# Patient Record
Sex: MALE | Race: WHITE | NOT HISPANIC OR LATINO | Employment: FULL TIME | URBAN - METROPOLITAN AREA
[De-identification: names, ages, dates, MRNs, and addresses within clinical notes are randomized per-mention and may not be internally consistent; named-entity substitution may affect disease eponyms.]

---

## 2019-11-03 ENCOUNTER — APPOINTMENT (EMERGENCY)
Dept: RADIOLOGY | Facility: HOSPITAL | Age: 49
DRG: 871 | End: 2019-11-03

## 2019-11-03 ENCOUNTER — APPOINTMENT (INPATIENT)
Dept: RADIOLOGY | Facility: HOSPITAL | Age: 49
DRG: 871 | End: 2019-11-03

## 2019-11-03 ENCOUNTER — HOSPITAL ENCOUNTER (INPATIENT)
Facility: HOSPITAL | Age: 49
LOS: 2 days | Discharge: HOME | DRG: 871 | End: 2019-11-05
Attending: EMERGENCY MEDICINE | Admitting: INTERNAL MEDICINE

## 2019-11-03 DIAGNOSIS — R77.8 ELEVATED TROPONIN I LEVEL: ICD-10-CM

## 2019-11-03 DIAGNOSIS — J69.0 ASPIRATION PNEUMONIA (HCC): ICD-10-CM

## 2019-11-03 DIAGNOSIS — Z72.0 TOBACCO ABUSE: Chronic | ICD-10-CM

## 2019-11-03 DIAGNOSIS — T40.1X1A HEROIN OVERDOSE (HCC): ICD-10-CM

## 2019-11-03 DIAGNOSIS — A41.9 SEPTIC SHOCK (HCC): Primary | ICD-10-CM

## 2019-11-03 DIAGNOSIS — R65.21 SEPTIC SHOCK (HCC): Primary | ICD-10-CM

## 2019-11-03 PROBLEM — R73.9 HYPERGLYCEMIA: Status: ACTIVE | Noted: 2019-11-03

## 2019-11-03 PROBLEM — J96.02 ACUTE RESPIRATORY FAILURE WITH HYPOXIA AND HYPERCAPNIA (HCC): Status: ACTIVE | Noted: 2019-11-03

## 2019-11-03 PROBLEM — E87.2 LACTIC ACIDOSIS: Status: ACTIVE | Noted: 2019-11-03

## 2019-11-03 PROBLEM — R74.8 ELEVATED CK: Status: ACTIVE | Noted: 2019-11-03

## 2019-11-03 PROBLEM — M25.531 RIGHT WRIST PAIN: Status: ACTIVE | Noted: 2019-11-03

## 2019-11-03 PROBLEM — G93.40 ENCEPHALOPATHY: Status: ACTIVE | Noted: 2019-11-03

## 2019-11-03 PROBLEM — R79.89 ELEVATED TROPONIN: Status: ACTIVE | Noted: 2019-11-03

## 2019-11-03 PROBLEM — N17.9 AKI (ACUTE KIDNEY INJURY) (HCC): Status: ACTIVE | Noted: 2019-11-03

## 2019-11-03 PROBLEM — G93.40 ENCEPHALOPATHY: Status: RESOLVED | Noted: 2019-11-03 | Resolved: 2019-11-03

## 2019-11-03 PROBLEM — E87.20 LACTIC ACIDOSIS: Status: ACTIVE | Noted: 2019-11-03

## 2019-11-03 PROBLEM — R65.20 SEVERE SEPSIS (HCC): Status: ACTIVE | Noted: 2019-11-03

## 2019-11-03 PROBLEM — T50.901A ACCIDENTAL DRUG OVERDOSE: Status: ACTIVE | Noted: 2019-11-03

## 2019-11-03 PROBLEM — J96.01 ACUTE RESPIRATORY FAILURE WITH HYPOXIA (HCC): Status: ACTIVE | Noted: 2019-11-03

## 2019-11-03 LAB
ACANTHOCYTES BLD QL SMEAR: PRESENT
ALBUMIN SERPL BCP-MCNC: 3.8 G/DL (ref 3.5–5)
ALP SERPL-CCNC: 72 U/L (ref 46–116)
ALT SERPL W P-5'-P-CCNC: 32 U/L (ref 12–78)
AMPHETAMINES SERPL QL SCN: NEGATIVE
ANION GAP SERPL CALCULATED.3IONS-SCNC: 14 MMOL/L (ref 4–13)
APAP SERPL-MCNC: <2 UG/ML (ref 10–20)
APTT PPP: 27 SECONDS (ref 23–37)
AST SERPL W P-5'-P-CCNC: 29 U/L (ref 5–45)
BARBITURATES UR QL: NEGATIVE
BASE EXCESS BLDA CALC-SCNC: -6 MMOL/L (ref -2–3)
BASOPHILS # BLD MANUAL: 0 THOUSAND/UL (ref 0–0.1)
BASOPHILS NFR MAR MANUAL: 0 % (ref 0–1)
BENZODIAZ UR QL: NEGATIVE
BILIRUB SERPL-MCNC: 0.5 MG/DL (ref 0.2–1)
BILIRUB UR QL STRIP: NEGATIVE
BUN SERPL-MCNC: 26 MG/DL (ref 5–25)
CALCIUM SERPL-MCNC: 8.5 MG/DL (ref 8.3–10.1)
CHLORIDE SERPL-SCNC: 101 MMOL/L (ref 100–108)
CK MB SERPL-MCNC: 1.3 % (ref 0–2.5)
CK MB SERPL-MCNC: 4.6 NG/ML (ref 0–5)
CK SERPL-CCNC: 344 U/L (ref 39–308)
CLARITY UR: CLEAR
CO2 SERPL-SCNC: 23 MMOL/L (ref 21–32)
COCAINE UR QL: NEGATIVE
COLOR UR: YELLOW
CREAT SERPL-MCNC: 1.52 MG/DL (ref 0.6–1.3)
EOSINOPHIL # BLD MANUAL: 0 THOUSAND/UL (ref 0–0.4)
EOSINOPHIL NFR BLD MANUAL: 0 % (ref 0–6)
ERYTHROCYTE [DISTWIDTH] IN BLOOD BY AUTOMATED COUNT: 14.1 % (ref 11.6–15.1)
EST. AVERAGE GLUCOSE BLD GHB EST-MCNC: 134 MG/DL
ETHANOL SERPL-MCNC: <3 MG/DL (ref 0–3)
FIO2 GAS DIL.REBREATH: 85 L
GFR SERPL CREATININE-BSD FRML MDRD: 54 ML/MIN/1.73SQ M
GLUCOSE SERPL-MCNC: 220 MG/DL (ref 65–140)
GLUCOSE SERPL-MCNC: 61 MG/DL (ref 65–140)
GLUCOSE SERPL-MCNC: 91 MG/DL (ref 65–140)
GLUCOSE UR STRIP-MCNC: ABNORMAL MG/DL
HBA1C MFR BLD: 6.3 % (ref 4.2–6.3)
HCO3 BLDA-SCNC: 20.9 MMOL/L (ref 22–28)
HCT VFR BLD AUTO: 42.5 % (ref 36.5–49.3)
HGB BLD-MCNC: 13.3 G/DL (ref 12–17)
HGB UR QL STRIP.AUTO: NEGATIVE
HYPERCHROMIA BLD QL SMEAR: PRESENT
INR PPP: 1.01 (ref 0.84–1.19)
KETONES UR STRIP-MCNC: NEGATIVE MG/DL
LACTATE SERPL-SCNC: 2 MMOL/L (ref 0.5–2)
LACTATE SERPL-SCNC: 5.2 MMOL/L (ref 0.5–2)
LEUKOCYTE ESTERASE UR QL STRIP: NEGATIVE
LYMPHOCYTES # BLD AUTO: 1.2 THOUSAND/UL (ref 0.6–4.47)
LYMPHOCYTES # BLD AUTO: 6 % (ref 14–44)
MCH RBC QN AUTO: 30.4 PG (ref 26.8–34.3)
MCHC RBC AUTO-ENTMCNC: 31.3 G/DL (ref 31.4–37.4)
MCV RBC AUTO: 97 FL (ref 82–98)
METHADONE UR QL: NEGATIVE
MONOCYTES # BLD AUTO: 0.2 THOUSAND/UL (ref 0–1.22)
MONOCYTES NFR BLD: 1 % (ref 4–12)
NEUTROPHILS # BLD MANUAL: 18.64 THOUSAND/UL (ref 1.85–7.62)
NEUTS BAND NFR BLD MANUAL: 3 % (ref 0–8)
NEUTS SEG NFR BLD AUTO: 90 % (ref 43–75)
NITRITE UR QL STRIP: NEGATIVE
NRBC BLD AUTO-RTO: 0 /100 WBCS
OPIATES UR QL SCN: NEGATIVE
PCO2 BLD: 22 MMOL/L (ref 21–32)
PCO2 BLD: 48.2 MM HG (ref 36–44)
PCP UR QL: NEGATIVE
PH BLD: 7.25 [PH] (ref 7.35–7.45)
PH UR STRIP.AUTO: 5.5 [PH]
PLATELET # BLD AUTO: 211 THOUSANDS/UL (ref 149–390)
PLATELET BLD QL SMEAR: ADEQUATE
PMV BLD AUTO: 11 FL (ref 8.9–12.7)
PO2 BLD: 64 MM HG (ref 75–129)
POIKILOCYTOSIS BLD QL SMEAR: PRESENT
POLYCHROMASIA BLD QL SMEAR: PRESENT
POTASSIUM SERPL-SCNC: 4.8 MMOL/L (ref 3.5–5.3)
PROCALCITONIN SERPL-MCNC: 0.6 NG/ML
PROT SERPL-MCNC: 7.3 G/DL (ref 6.4–8.2)
PROT UR STRIP-MCNC: NEGATIVE MG/DL
PROTHROMBIN TIME: 13 SECONDS (ref 11.6–14.5)
RBC # BLD AUTO: 4.37 MILLION/UL (ref 3.88–5.62)
RBC MORPH BLD: PRESENT
SALICYLATES SERPL-MCNC: 3.1 MG/DL (ref 3–20)
SAO2 % BLD FROM PO2: 88 % (ref 60–85)
SODIUM SERPL-SCNC: 138 MMOL/L (ref 136–145)
SP GR UR STRIP.AUTO: 1.02 (ref 1–1.03)
SPECIMEN SOURCE: ABNORMAL
THC UR QL: NEGATIVE
TOTAL CELLS COUNTED SPEC: 100
TROPONIN I SERPL-MCNC: 0.09 NG/ML
TROPONIN I SERPL-MCNC: 0.31 NG/ML
TROPONIN I SERPL-MCNC: 0.32 NG/ML
TSH SERPL DL<=0.05 MIU/L-ACNC: 0.5 UIU/ML (ref 0.36–3.74)
UROBILINOGEN UR QL STRIP.AUTO: 0.2 E.U./DL
WBC # BLD AUTO: 20.04 THOUSAND/UL (ref 4.31–10.16)

## 2019-11-03 PROCEDURE — 82948 REAGENT STRIP/BLOOD GLUCOSE: CPT

## 2019-11-03 PROCEDURE — 87040 BLOOD CULTURE FOR BACTERIA: CPT | Performed by: EMERGENCY MEDICINE

## 2019-11-03 PROCEDURE — 80329 ANALGESICS NON-OPIOID 1 OR 2: CPT | Performed by: EMERGENCY MEDICINE

## 2019-11-03 PROCEDURE — 99223 1ST HOSP IP/OBS HIGH 75: CPT | Performed by: INTERNAL MEDICINE

## 2019-11-03 PROCEDURE — 84443 ASSAY THYROID STIM HORMONE: CPT | Performed by: NURSE PRACTITIONER

## 2019-11-03 PROCEDURE — 84145 PROCALCITONIN (PCT): CPT | Performed by: INTERNAL MEDICINE

## 2019-11-03 PROCEDURE — 99285 EMERGENCY DEPT VISIT HI MDM: CPT

## 2019-11-03 PROCEDURE — 82553 CREATINE MB FRACTION: CPT | Performed by: EMERGENCY MEDICINE

## 2019-11-03 PROCEDURE — 36415 COLL VENOUS BLD VENIPUNCTURE: CPT | Performed by: EMERGENCY MEDICINE

## 2019-11-03 PROCEDURE — 82803 BLOOD GASES ANY COMBINATION: CPT

## 2019-11-03 PROCEDURE — 73100 X-RAY EXAM OF WRIST: CPT

## 2019-11-03 PROCEDURE — 80307 DRUG TEST PRSMV CHEM ANLYZR: CPT | Performed by: NURSE PRACTITIONER

## 2019-11-03 PROCEDURE — 81003 URINALYSIS AUTO W/O SCOPE: CPT | Performed by: NURSE PRACTITIONER

## 2019-11-03 PROCEDURE — 80053 COMPREHEN METABOLIC PANEL: CPT | Performed by: EMERGENCY MEDICINE

## 2019-11-03 PROCEDURE — 80320 DRUG SCREEN QUANTALCOHOLS: CPT | Performed by: EMERGENCY MEDICINE

## 2019-11-03 PROCEDURE — 83036 HEMOGLOBIN GLYCOSYLATED A1C: CPT | Performed by: NURSE PRACTITIONER

## 2019-11-03 PROCEDURE — 84484 ASSAY OF TROPONIN QUANT: CPT | Performed by: EMERGENCY MEDICINE

## 2019-11-03 PROCEDURE — 85610 PROTHROMBIN TIME: CPT | Performed by: EMERGENCY MEDICINE

## 2019-11-03 PROCEDURE — 71045 X-RAY EXAM CHEST 1 VIEW: CPT

## 2019-11-03 PROCEDURE — 94760 N-INVAS EAR/PLS OXIMETRY 1: CPT

## 2019-11-03 PROCEDURE — 83605 ASSAY OF LACTIC ACID: CPT | Performed by: EMERGENCY MEDICINE

## 2019-11-03 PROCEDURE — 82550 ASSAY OF CK (CPK): CPT | Performed by: EMERGENCY MEDICINE

## 2019-11-03 PROCEDURE — 85027 COMPLETE CBC AUTOMATED: CPT | Performed by: EMERGENCY MEDICINE

## 2019-11-03 PROCEDURE — 96361 HYDRATE IV INFUSION ADD-ON: CPT

## 2019-11-03 PROCEDURE — 96360 HYDRATION IV INFUSION INIT: CPT

## 2019-11-03 PROCEDURE — 36600 WITHDRAWAL OF ARTERIAL BLOOD: CPT

## 2019-11-03 PROCEDURE — 85007 BL SMEAR W/DIFF WBC COUNT: CPT | Performed by: EMERGENCY MEDICINE

## 2019-11-03 PROCEDURE — 85730 THROMBOPLASTIN TIME PARTIAL: CPT | Performed by: EMERGENCY MEDICINE

## 2019-11-03 PROCEDURE — 84484 ASSAY OF TROPONIN QUANT: CPT | Performed by: NURSE PRACTITIONER

## 2019-11-03 PROCEDURE — 93005 ELECTROCARDIOGRAM TRACING: CPT

## 2019-11-03 PROCEDURE — 99285 EMERGENCY DEPT VISIT HI MDM: CPT | Performed by: EMERGENCY MEDICINE

## 2019-11-03 RX ORDER — ACETAMINOPHEN 325 MG/1
650 TABLET ORAL EVERY 6 HOURS PRN
Status: DISCONTINUED | OUTPATIENT
Start: 2019-11-03 | End: 2019-11-05 | Stop reason: HOSPADM

## 2019-11-03 RX ORDER — NALOXONE HYDROCHLORIDE 1 MG/ML
1 INJECTION PARENTERAL ONCE
Status: COMPLETED | OUTPATIENT
Start: 2019-11-03 | End: 2019-11-03

## 2019-11-03 RX ORDER — ONDANSETRON 2 MG/ML
2 INJECTION INTRAMUSCULAR; INTRAVENOUS ONCE
Status: COMPLETED | OUTPATIENT
Start: 2019-11-03 | End: 2019-11-03

## 2019-11-03 RX ORDER — HEPARIN SODIUM 5000 [USP'U]/ML
5000 INJECTION, SOLUTION INTRAVENOUS; SUBCUTANEOUS EVERY 8 HOURS SCHEDULED
Status: DISCONTINUED | OUTPATIENT
Start: 2019-11-03 | End: 2019-11-05

## 2019-11-03 RX ORDER — NICOTINE 21 MG/24HR
1 PATCH, TRANSDERMAL 24 HOURS TRANSDERMAL DAILY
Status: DISCONTINUED | OUTPATIENT
Start: 2019-11-03 | End: 2019-11-05 | Stop reason: HOSPADM

## 2019-11-03 RX ORDER — CEFTRIAXONE 1 G/50ML
1000 INJECTION, SOLUTION INTRAVENOUS EVERY 24 HOURS
Status: DISCONTINUED | OUTPATIENT
Start: 2019-11-04 | End: 2019-11-05

## 2019-11-03 RX ORDER — LORAZEPAM 2 MG/ML
1 INJECTION INTRAMUSCULAR ONCE
Status: COMPLETED | OUTPATIENT
Start: 2019-11-03 | End: 2019-11-03

## 2019-11-03 RX ADMIN — HEPARIN SODIUM 5000 UNITS: 5000 INJECTION INTRAVENOUS; SUBCUTANEOUS at 18:17

## 2019-11-03 RX ADMIN — SODIUM CHLORIDE 1000 ML: 0.9 INJECTION, SOLUTION INTRAVENOUS at 14:08

## 2019-11-03 RX ADMIN — SODIUM CHLORIDE 1000 ML: 0.9 INJECTION, SOLUTION INTRAVENOUS at 15:50

## 2019-11-03 RX ADMIN — CEFEPIME HYDROCHLORIDE 2000 MG: 2 INJECTION, SOLUTION INTRAVENOUS at 15:44

## 2019-11-03 RX ADMIN — METRONIDAZOLE 500 MG: 500 INJECTION, SOLUTION INTRAVENOUS at 15:51

## 2019-11-03 RX ADMIN — NICOTINE 1 PATCH: 21 PATCH, EXTENDED RELEASE TRANSDERMAL at 18:17

## 2019-11-03 RX ADMIN — SODIUM CHLORIDE 448 ML: 0.9 INJECTION, SOLUTION INTRAVENOUS at 16:22

## 2019-11-03 RX ADMIN — HEPARIN SODIUM 5000 UNITS: 5000 INJECTION INTRAVENOUS; SUBCUTANEOUS at 21:58

## 2019-11-03 NOTE — H&P
H&P- Fatemeh Jimenez 1970, 54 y o  male MRN: [id number]    Unit/Bed#: -02 Encounter: [id number]    Primary Care Provider: No primary care provider on file  Date and time admitted to hospital: 11/3/2019  2:03 PM        * Acute respiratory failure with hypoxia and hypercapnia (HCC)  Assessment & Plan  · Secondary to aspiration pneumonia versus pneumonitis  · Chest x-ray with left-sided haziness  · Continue ceftriaxone/Flagyl  · Transition from non-rebreather to high-flow nasal cannula for saturations greater than 92%  · Incentive spirometry/pulmonary hygiene  · Repeat chest x-ray in the a m  Severe sepsis ([facility] [address] )  Assessment & Plan  · Secondary to aspiration pneumonia/pneumonitis  · Chest x-ray with left-sided haziness  · Patient was found in his car with vomit currently coughing up food particles  · Blood cultures pending  · Check procalcitonin/UA  · Ceftriaxone/Flagyl    Accidental drug overdose  Assessment & Plan  · Patient admits to snorting 1 bag of heroin  · Denies any suicidal ideation  · Cessation education  · Monitor for withdrawal symptoms  · Patient states he uses only occasionally last use was greater than 1 month ago  · Coma panel pending    Lactic acidosis  Assessment & Plan  · 5 2  · 30 cc/kilos IV fluids given trend    Aspiration pneumonitis (HCC)  Assessment & Plan  · As above    LETITIA (acute kidney injury) ([facility] [address] )  Assessment & Plan  · Likely pre renal  · IV fluid 30 cc/kilogram bolus if not taking oral start IV fluid infusion  · BMP in the a m  · Check UA  ·     Right wrist pain  Assessment & Plan  · Strength slightly weaker at 4/5  Sensation decreased in distal hand  · Concern for potential nerve injury  · Pulses palpable  · Will check x-ray    Elevated CK  Assessment & Plan  · At 344 Mild  · Volume resuscitation a m   CK    Hyperglycemia  Assessment & Plan  · Check A1c  · Sliding-scale insulin    Elevated troponin  Assessment & Plan  · Troponin 0 09 trend  · EKG without acute changes    Tobacco abuse  Assessment & Plan  · Nicotine patch    Encephalopathyresolved as of 11/3/2019  Assessment & Plan  · Secondary to heroin overdose resolved post Narcan    VTE Prophylaxis: Heparin  / sequential compression device   Code Status:  Full code  POLST: POLST form is not discussed and not completed at this time  Discussion with family:  Patient    Anticipated Length of Stay:  Patient will be admitted on an Inpatient basis with an anticipated length of stay of  greater than 2 midnights  Justification for Hospital Stay:  Pneumonia/hypoxia    Total Time for Visit, including Counseling / Coordination of Care: 45 minutes  Greater than 50% of this total time spent on direct patient counseling and coordination of care  Chief Complaint:   Unresponsiveness    History of Present Illness:    Aylin Liu is a 54 y o  male with no significant past medical history other than heroin abuse who presents with found unresponsive in his car  Patient states he remembers getting up this morning to run errands  He went into a bathroom and snorted a bag of heroin  He remembers getting back in his car but does not remember after that  Per ER reports he was found in his driveway by his father covered in vomit unresponsive with the windows down  EMS was called  Narcan 0 4 mg was given with minimal response  With additional 0 4 mg patient became agitated and was given Ativan  On arrival to the ER he was noted to be hypothermic at 95 3 tachycardic and hypertensive in the 170s oxygen saturations were in the 70s to 80s on room air was placed on non-rebreather mask for sats of 93%  Labs revealed a WBC count of 20/creatinine of 1 52/lactic acid of 5 2/CK of 344  ABG revealed 7 25/48/64/20/6/88%  Chest x-ray with left-sided haziness and patient was given cefepime/Flagyl    Review of Systems:    Review of Systems   Musculoskeletal: Positive for myalgias     All other systems reviewed and are negative  Past Medical and Surgical History:     Past Medical History:   Diagnosis Date    Heroin use        History reviewed  No pertinent surgical history  Meds/Allergies:    Prior to Admission medications    Not on File     I have reviewed home medications with patient personally  Allergies: No Known Allergies    Social History:     Marital Status: Unknown   Occupation:    Patient Pre-hospital Living Situation:  Home  Patient Pre-hospital Level of Mobility:  Active  Patient Pre-hospital Diet Restrictions:  None  Substance Use History:   Social History     Substance and Sexual Activity   Alcohol Use Yes    Frequency: Monthly or less     Social History     Tobacco Use   Smoking Status Current Every Day Smoker    Packs/day: 1 00    Years: 30 00    Pack years: 30 00   Smokeless Tobacco Never Used     Social History     Substance and Sexual Activity   Drug Use Yes    Types: Marijuana, Heroin       Family History:    History reviewed  No pertinent family history  Physical Exam:     Vitals:   Blood Pressure: 121/81 (11/03/19 1630)  Pulse: (!) 113 (11/03/19 1630)  Temperature: 98 3 °F (36 8 °C) (11/03/19 1600)  Temp Source: Oral (11/03/19 1600)  Respirations: 20 (11/03/19 1630)  Weight - Scale: 81 6 kg (180 lb) (11/03/19 1442)  SpO2: 95 % (11/03/19 1630)    Physical Exam   Constitutional: He is oriented to person, place, and time  No distress  HENT:   Head: Normocephalic and atraumatic  Mouth/Throat: Oropharynx is clear and moist    Eyes: Pupils are equal, round, and reactive to light  Conjunctivae are normal  No scleral icterus  Neck: Neck supple  No tracheal deviation present  Cardiovascular: Regular rhythm, normal heart sounds and intact distal pulses  Tachycardia present  Exam reveals no gallop and no friction rub  No murmur heard  Pulmonary/Chest: Effort normal  No respiratory distress   He has decreased breath sounds in the left upper field, the left middle field and the left lower field  He has no wheezes  He has no rales  Decreased breath sounds on the left and bronchial   Abdominal: Soft  Bowel sounds are normal  He exhibits no distension  There is no tenderness  Musculoskeletal: He exhibits no edema, tenderness or deformity  Left hand strength 4/5 patient with difficulty flexing and extending rest denies any numbness or tingling  Sensation slightly decreased from rest down  Neurological: He is alert and oriented to person, place, and time  No cranial nerve deficit  Skin: Skin is warm and dry  No rash noted  No erythema  No pallor  Psychiatric: He has a normal mood and affect  His behavior is normal            Additional Data:     Lab Results: I have personally reviewed pertinent reports  and I have personally reviewed pertinent films in PACS    Results from last 7 days   Lab Units 11/03/19  1407   WBC Thousand/uL 20 04*   HEMOGLOBIN g/dL 13 3   HEMATOCRIT % 42 5   PLATELETS Thousands/uL 211   BANDS PCT % 3   LYMPHO PCT % 6*   MONO PCT % 1*   EOS PCT % 0     Results from last 7 days   Lab Units 11/03/19  1607 11/03/19  1407   SODIUM mmol/L  --  138   POTASSIUM mmol/L  --  4 8   CHLORIDE mmol/L  --  101   CO2 mmol/L  --  23   CO2, I-STAT mmol/L 22  --    BUN mg/dL  --  26*   CREATININE mg/dL  --  1 52*   ANION GAP mmol/L  --  14*   CALCIUM mg/dL  --  8 5   ALBUMIN g/dL  --  3 8   TOTAL BILIRUBIN mg/dL  --  0 50   ALK PHOS U/L  --  72   ALT U/L  --  32   AST U/L  --  29   GLUCOSE RANDOM mg/dL  --  220*     Results from last 7 days   Lab Units 11/03/19  1431   INR  1 01             Results from last 7 days   Lab Units 11/03/19  1620 11/03/19  1431   LACTIC ACID mmol/L 2 0 5 2*       Imaging: I have personally reviewed pertinent reports     and I have personally reviewed pertinent films in PACS    XR chest 1 view portable   ED Interpretation by Luigi Trejo DO (11/03 4366)   Abnormal   Increased markings/patchy infiltrate left side          EKG, Pathology, and Other Studies Reviewed on Admission:   · EKG:  Sinus tachycardia no acute ST changes QRS 84     Allscripts / Epic Records Reviewed: Yes     ** Please Note: This note has been constructed using a voice recognition system   **

## 2019-11-03 NOTE — ASSESSMENT & PLAN NOTE
· Strength slightly weaker at 4/5  Sensation decreased in distal hand    · Concern for potential nerve injury  · Pulses palpable  · Will check x-ray

## 2019-11-03 NOTE — ASSESSMENT & PLAN NOTE
· Secondary to aspiration pneumonia/pneumonitis  · Chest x-ray with left-sided haziness  · Patient was found in his car with vomit currently coughing up food particles  · Blood cultures pending  · Check procalcitonin/UA  · Ceftriaxone/Flagyl

## 2019-11-03 NOTE — ASSESSMENT & PLAN NOTE
· Patient admits to snorting 1 bag of heroin  · Denies any suicidal ideation  · Cessation education  · Monitor for withdrawal symptoms  · Patient states he uses only occasionally last use was greater than 1 month ago  · Coma panel pending

## 2019-11-03 NOTE — SEPSIS NOTE
Sepsis Note   Shirley Galloway 54 y o  male MRN: [id number]  Unit/Bed#: ED 07 Encounter: [id number]      qSOFA     Row Name 11/03/19 1430 11/03/19 1359             Altered mental status GCS < 15           Respiratory Rate > / =22  0  0       Systolic BP < / =185  0  0       Q Sofa Score  0  0           Initial Sepsis Screening     Row Name 11/03/19 1538                Is the patient's history suggestive of a new or worsening infection? (!) Yes (Proceed)  -VL        Suspected source of infection  pneumonia  -VL        Are two or more of the following signs & symptoms of infection both present and new to the patient? (!) Yes (Proceed)  -VL        Indicate SIRS criteria  Altered mental status; Tachypnea > 20 resp per min;Leukocytosis (WBC > 57583 IJL)  -VL        If the answer is yes to both questions, suspicion of sepsis is present          If severe sepsis is present AND tissue hypoperfusion perists in the hour after fluid resuscitation or lactate > 4, the patient meets criteria for SEPTIC SHOCK          Are any of the following organ dysfunction criteria present within 6 hours of suspected infection and SIRS criteria that are NOT considered to be chronic conditions? (!) Yes  -VL        Organ dysfunction  Lactate >/equal 4 0 mmol/L  -VL        Date of presentation of severe sepsis          Time of presentation of severe sepsis          Tissue hypoperfusion persists in the hour after crystalloid fluid administration, evidenced, by either:  * OR * Lactate level is greater to or equal 4 mmol/dL ( ___ mmol/dL in comment field)  -VL        Was hypotension present within one hour of the conclusion of crystalloid fluid administration?           Date of presentation of septic shock  11/03/19  -VL        Time of presentation of septic shock  1538  -VL          User Key  (r) = Recorded By, (t) = Taken By, (c) = Cosigned By    [address] Name Provider Type    VL DO Loren Rodriguez

## 2019-11-03 NOTE — ED PROVIDER NOTES
History  Chief Complaint   Patient presents with    Overdose - Accidental     Pt found by his father slumped over in his car with the windows down, possibly there overnight  Pt has a known history of drug abuse  Hx of heroin abuse  Pt remembers going out yesterday am and then nothing else  Per EMS, father said pt went to work yesterday and dad went to bed before pt came home  This am, dad went outside and found pt in his vehicle with the windows down, unresponsive, covered in vomit  Upon EMS arrival, pt unresponsive, pinpoint pupils, agonal resps  Given 0 4mg Narcan w/ minimal improvement  Given additional 0 4mg narcan and woke up, combative, tremulous, retching  Given ativan en route for agitation  Upon arrival to the ED, pt awake and alert and states he only remembers going out yesterday  Admits to recreational heroin use, snorts  Denies SI/HI  Denies other substances at this time      History provided by:  Patient and EMS personnel  History limited by:  Mental status change   used: No    Overdose - Accidental   Ingested substance:  Illicit drugs  Suspected agents: heroin  Time since incident: unknown  Ingestion amount:  Unknown  Witnesses present: no    Called poison control: no    Incident location:  Unable to specify  Context: intentional ingestion, recreational and substance found on patient's clothing    Context: not suicide attempt    Associated symptoms: unresponsiveness        None       Past Medical History:   Diagnosis Date    Heroin use        History reviewed  No pertinent surgical history  Family History   Problem Relation Age of Onset    Diabetes Father      I have reviewed and agree with the history as documented      Social History     Tobacco Use    Smoking status: Current Every Day Smoker     Packs/day: 1 00     Years: 30 00     Pack years: 30 00    Smokeless tobacco: Never Used   Substance Use Topics    Alcohol use: Yes     Frequency: Monthly or less Comment: "maybe twice a year"    Drug use: Yes     Types: Marijuana, Heroin        Review of Systems   Unable to perform ROS: Mental status change       Physical Exam  Physical Exam   Constitutional: He appears well-developed and well-nourished  HENT:   Nose: Nose normal    vomitus noted surrounding mouth   Eyes: Pupils are equal, round, and reactive to light  Conjunctivae and EOM are normal    Neck: Neck supple  Cardiovascular: Normal rate and regular rhythm  Pulmonary/Chest: No accessory muscle usage  Tachypnea noted  No respiratory distress  He has decreased breath sounds  Abdominal: Soft  There is no tenderness  Musculoskeletal: He exhibits no deformity  Neurological: He is alert  Skin: Skin is warm  Psychiatric: He has a normal mood and affect  Nursing note and vitals reviewed        Vital Signs  ED Triage Vitals   Temperature Pulse Respirations Blood Pressure SpO2   11/03/19 1359 11/03/19 1359 11/03/19 1359 11/03/19 1359 11/03/19 1359   (!) 95 3 °F (35 2 °C) (!) 113 12 (!) 178/85 93 %      Temp Source Heart Rate Source Patient Position - Orthostatic VS BP Location FiO2 (%)   11/03/19 1359 11/03/19 1359 11/03/19 1359 11/03/19 1359 11/03/19 1705   Rectal Monitor Lying Right arm 80      Pain Score       11/03/19 1359       No Pain           Vitals:    11/03/19 1600 11/03/19 1630 11/03/19 1645 11/03/19 1655   BP: 118/72 121/81 129/74 124/80   Pulse: 104 (!) 113 95 100   Patient Position - Orthostatic VS: Lying Lying  Lying         Visual Acuity      ED Medications  Medications   sodium chloride 0 9 % bolus 1,000 mL (0 mL Intravenous Stopped 11/3/19 1622)     Followed by   sodium chloride 0 9 % bolus 1,000 mL (1,000 mL Intravenous Not Given 11/3/19 1624)     Followed by   sodium chloride 0 9 % bolus 448 mL (448 mL Intravenous New Bag 11/3/19 1622)   nicotine (NICODERM CQ) 21 mg/24 hr TD 24 hr patch 1 patch (1 patch Transdermal Medication Applied 11/3/19 1817)   heparin (porcine) subcutaneous injection 5,000 Units (5,000 Units Subcutaneous Given 11/3/19 1817)   acetaminophen (TYLENOL) tablet 650 mg (has no administration in time range)   insulin lispro (HumaLOG) 100 units/mL subcutaneous injection 1-6 Units (1 Units Subcutaneous Not Given 11/3/19 1715)   insulin lispro (HumaLOG) 100 units/mL subcutaneous injection 1-5 Units (has no administration in time range)   cefTRIAXone (ROCEPHIN) IVPB (premix) 1,000 mg (has no administration in time range)   metroNIDAZOLE (FLAGYL) IVPB (premix) 500 mg (has no administration in time range)   sodium chloride 0 9 % bolus 1,000 mL (0 mL Intravenous Stopped 11/3/19 1622)   naloxone (FOR EMS ONLY) (NARCAN) 2 MG/2ML injection 2 mg (0 mg Does not apply Given to EMS 11/3/19 1408)   ondansetron (FOR EMS ONLY) (ZOFRAN) 4 mg/2 mL injection 8 mg (0 mg Does not apply Given to EMS 11/3/19 1408)   LORazepam (FOR EMS ONLY) (ATIVAN) 2 mg/mL injection 2 mg (0 mg Does not apply Given to EMS 11/3/19 1408)   cefepime (MAXIPIME) 2 g/50 mL dextrose IVPB (0 mg Intravenous Stopped 11/3/19 1551)   metroNIDAZOLE (FLAGYL) IVPB (premix) 500 mg (0 mg Intravenous Stopped 11/3/19 1622)       Diagnostic Studies  Results Reviewed     Procedure Component Value Units Date/Time    Rapid drug screen, urine [[id number]] Collected:  11/03/19 1809    Lab Status:  No result Specimen:  Urine, Clean Catch     TSH, 3rd generation with Free T4 reflex [[id number]]  (Normal) Collected:  11/03/19 1407    Lab Status:  Final result Specimen:  Blood from Arm, Left Updated:  11/03/19 1729     TSH 3RD GENERATON 0 498 uIU/mL     Narrative:       Patients undergoing fluorescein dye angiography may retain small amounts of fluorescein in the body for 48-72 hours post procedure  Samples containing fluorescein can produce falsely depressed TSH values  If the patient had this procedure,a specimen should be resubmitted post fluorescein clearance        Hemoglobin A1C w/ EAG Estimation [[id number]] Collected:  11/03/19 1407    Lab Status: In process Specimen:  Blood from Arm, Left Updated:  11/03/19 1714    Lactic acid x2 Q2H [[id number]]  (Normal) Collected:  11/03/19 1620    Lab Status:  Final result Specimen:  Blood from Arm, Right Updated:  11/03/19 1654     LACTIC ACID 2 0 mmol/L     Narrative:       Result may be elevated if tourniquet was used during collection  CBC and differential [[id number]]  (Abnormal) Collected:  11/03/19 1407    Lab Status:  Final result Specimen:  Blood from Arm, Left Updated:  11/03/19 1619     WBC 20 04 Thousand/uL      RBC 4 37 Million/uL      Hemoglobin 13 3 g/dL      Hematocrit 42 5 %      MCV 97 fL      MCH 30 4 pg      MCHC 31 3 g/dL      RDW 14 1 %      MPV 11 0 fL      Platelets 602 Thousands/uL      nRBC 0 /100 WBCs     Narrative: This is an appended report  These results have been appended to a previously verified report  Lactic acid x2 Q2H [[id number]]     Lab Status:  No result Specimen:  Blood     POCT Blood Gas (G3+) [[id number]]  (Abnormal) Collected:  11/03/19 1607    Lab Status:  Final result Specimen:  Arterial Updated:  11/03/19 1613     pH, Art i-STAT 7 245     pCO2, Art i-STAT 48 2 mm HG      pO2, ART i-STAT 64 0 mm HG      BE, i-STAT -6 mmol/L      HCO3, Art i-STAT 20 9 mmol/L      CO2, i-STAT 22 mmol/L      O2 Sat, i-STAT 88 %      POC FIO2 85 L      Specimen Type ARTERIAL    Lactic acid, plasma [[id number]]  (Abnormal) Collected:  11/03/19 1431    Lab Status:  Final result Specimen:  Blood from Arm, Left Updated:  11/03/19 1534     LACTIC ACID 5 2 mmol/L     Narrative:       Result may be elevated if tourniquet was used during collection  Acetaminophen level-If concentration is detectable, please discuss with medical  on call   [[id number]]  (Abnormal) Collected:  11/03/19 1407    Lab Status:  Final result Specimen:  Blood from Arm, Left Updated:  11/03/19 1532     Acetaminophen Level <2 0 ug/mL     CKMB [[id number]]  (Normal) Collected:  11/03/19 1407    Lab Status:  Final result Specimen:  Blood from Arm, Left Updated:  11/03/19 1526     CK-MB Index 1 3 %      CK-MB 4 6 ng/mL     Salicylate level [[id number]]  (Normal) Collected:  11/03/19 1407    Lab Status:  Final result Specimen:  Blood from Arm, Left Updated:  59/35/50 1103     Salicylate Lvl 3 1 mg/dL     CK (with reflex to MB) [[id number]]  (Abnormal) Collected:  11/03/19 1407    Lab Status:  Final result Specimen:  Blood from Arm, Left Updated:  11/03/19 1524     Total  U/L     Ethanol [[id number]]  (Normal) Collected:  11/03/19 1407    Lab Status:  Final result Specimen:  Blood from Arm, Left Updated:  11/03/19 1519     Ethanol Lvl <3 mg/dL     Troponin I [[id number]]  (Abnormal) Collected:  11/03/19 1407    Lab Status:  Final result Specimen:  Blood from Arm, Left Updated:  11/03/19 1506     Troponin I 0 09 ng/mL     Comprehensive metabolic panel [[id number]]  (Abnormal) Collected:  11/03/19 1407    Lab Status:  Final result Specimen:  Blood from Arm, Left Updated:  11/03/19 1505     Sodium 138 mmol/L      Potassium 4 8 mmol/L      Chloride 101 mmol/L      CO2 23 mmol/L      ANION GAP 14 mmol/L      BUN 26 mg/dL      Creatinine 1 52 mg/dL      Glucose 220 mg/dL      Calcium 8 5 mg/dL      AST 29 U/L      ALT 32 U/L      Alkaline Phosphatase 72 U/L      Total Protein 7 3 g/dL      Albumin 3 8 g/dL      Total Bilirubin 0 50 mg/dL      eGFR 54 ml/min/1 73sq m     Narrative:       [facility] guidelines for Chronic Kidney Disease (CKD):     Stage 1 with normal or high GFR (GFR > 90 mL/min/1 73 square meters)    Stage 2 Mild CKD (GFR = 60-89 mL/min/1 73 square meters)    Stage 3A Moderate CKD (GFR = 45-59 mL/min/1 73 square meters)    Stage 3B Moderate CKD (GFR = 30-44 mL/min/1 73 square meters)    Stage 4 Severe CKD (GFR = 15-29 mL/min/1 73 square meters)    Stage 5 End Stage CKD (GFR <15 mL/min/1 73 square meters)  Note: GFR calculation is accurate only with a steady state creatinine Protime-INR [[id number]]  (Normal) Collected:  11/03/19 1431    Lab Status:  Final result Specimen:  Blood from Arm, Left Updated:  11/03/19 1454     Protime 13 0 seconds      INR 1 01    APTT [[id number]]  (Normal) Collected:  11/03/19 1431    Lab Status:  Final result Specimen:  Blood from Arm, Left Updated:  11/03/19 1454     PTT 27 seconds     Blood culture #2 [[id number]] Collected:  11/03/19 1431    Lab Status: In process Specimen:  Blood from Arm, Right Updated:  11/03/19 1437    Blood culture #1 [[id number]] Collected:  11/03/19 1431    Lab Status: In process Specimen:  Blood from Arm, Left Updated:  11/03/19 1437    POCT urinalysis dipstick [[id number]]     Lab Status:  No result Specimen:  Urine                  XR chest 1 view portable   ED Interpretation by Grecia Villalobos DO (11/03 1456)   Abnormal   Increased markings/patchy infiltrate left side      XR wrist 2 vw right    (Results Pending)   XR chest portable    (Results Pending)              Procedures  ECG 12 Lead Documentation Only  Date/Time: 11/3/2019 2:10 PM  Performed by: Grecia Villalobos DO  Authorized by: Grecia Villalobos DO     ECG reviewed by me, the ED Provider: yes    Patient location:  ED  Previous ECG:     Previous ECG:  Unavailable  Interpretation:     Interpretation: non-specific    Rate:     ECG rate:  112    ECG rate assessment: tachycardic    Rhythm:     Rhythm: sinus tachycardia    Ectopy:     Ectopy: none    QRS:     QRS axis:  Right  ST segments:     ST segments:  Normal  T waves:     T waves: normal             ED Course      1615 pt started coughing and ended up bringing up solid particulate food material (two whole large elbow macaroni and some indistinct material) increasing the concern for possible aspiration    Pt will be transitioned from NRB to Vapotherm and admitted for further evaluation              Initial Sepsis Screening     Row Name 11/03/19 2462                Is the patient's history suggestive of a new or worsening infection? (!) Yes (Proceed)  -VL        Suspected source of infection  pneumonia  -VL        Are two or more of the following signs & symptoms of infection both present and new to the patient? (!) Yes (Proceed)  -VL        Indicate SIRS criteria  Altered mental status; Tachypnea > 20 resp per min;Leukocytosis (WBC > 98106 IJL)  -VL        If the answer is yes to both questions, suspicion of sepsis is present          If severe sepsis is present AND tissue hypoperfusion perists in the hour after fluid resuscitation or lactate > 4, the patient meets criteria for SEPTIC SHOCK          Are any of the following organ dysfunction criteria present within 6 hours of suspected infection and SIRS criteria that are NOT considered to be chronic conditions? (!) Yes  -VL        Organ dysfunction  Lactate >/equal 4 0 mmol/L  -VL        Date of presentation of severe sepsis          Time of presentation of severe sepsis          Tissue hypoperfusion persists in the hour after crystalloid fluid administration, evidenced, by either:  * OR * Lactate level is greater to or equal 4 mmol/dL ( ___ mmol/dL in comment field)  -VL        Was hypotension present within one hour of the conclusion of crystalloid fluid administration?           Date of presentation of septic shock  11/03/19  -VL        Time of presentation of septic shock  1538  -VL          User Key  (r) = Recorded By, (t) = Taken By, (c) = Cosigned By    [address] Name Provider Type    VL Caron Connolly DO Physician           Default Flowsheet Data (last 720 hours)      Sepsis Reassess     Row Name 11/03/19 1784                   Repeat Volume Status and Tissue Perfusion Assessment Performed    Repeat Volume Status and Tissue Perfusion Assessment Performed  Yes  -SS           Volume Status and Tissue Perfusion Post Fluid Resuscitation * Must Document All *    Vital Signs Reviewed (HR, RR, BP, T)  Yes  -SS        Shock Index Reviewed  Yes  -SS        Arterial Oxygen Saturation Reviewed (POx, SaO2 or SpO2)  Yes (comment %)  -SS        Cardio  (!) Normal S1/S2; Tachycardia  -SS        Pulmonary  Normal effort; Other (comment) decreased left   -SS        Capillary Refill  Brisk  -SS        Peripheral Pulses  Radial  -SS        Peripheral Pulse  +3  -SS        Skin  Warm;Dry  -SS        Urine output assessed  None  -SS           *OR*   Intensive Monitoring- Must Document One of the Following Four *:    Vital Signs Reviewed          * Central Venous Pressure (CVP or RAP)          * Central Venous Oxygen (SVO2, ScvO2 or Oxygen saturation via central catheter)          * Bedside Cardiovascular US in IVC diameter and % collapse          * Passive Leg Raise OR Crystalloid Challenge            User Key  (r) = Recorded By, (t) = Taken By, (c) = Cosigned By    Initials Name Provider Type    SS Guru Wolf, [address] Nurse Practitioner                MDM  Number of Diagnoses or Management Options  Aspiration pneumonia [facility]): new and requires workup  Elevated troponin I level: new and requires workup  Heroin overdose [facility]): new and requires workup  Septic shock [facility]): new and requires workup  Diagnosis management comments: Apparent accidental heroin overdose - concerns for aspiration    Will ck labs, ekg, cxr, continue O2 and re-eval       Amount and/or Complexity of Data Reviewed  Clinical lab tests: reviewed and ordered  Tests in the radiology section of CPT®: reviewed and ordered  Tests in the medicine section of CPT®: ordered and reviewed  Obtain history from someone other than the patient: yes  Independent visualization of images, tracings, or specimens: yes        Disposition  Final diagnoses:   Septic shock ([facility] [address] )   Aspiration pneumonia ([facility] [address] )   Heroin overdose ([facility] )   Elevated troponin I level     Time reflects when diagnosis was documented in both MDM as applicable and the Disposition within this note     Time User Action Codes Description Comment    11/3/2019  3:41 PM Ludin Everett Add [A41 9,  R65 21] Septic shock ([facility] [address] )     11/3/2019  3:41 PM Iveth Pan Add [J69 0] Aspiration pneumonia ([facility] )     11/3/2019  3:41 PM Iveth Pan Add [T40 1X1A] Heroin overdose ([facility] )     11/3/2019  3:41 PM Ludin Everett Add [R79 89] Elevated troponin I level       ED Disposition     ED Disposition Condition Date/Time Comment    Admit Stable Sun Nov 3, 2019  3:42 PM Case was discussed with Davi Chin [facility] and the patient's admission status was agreed to be Admission Status: inpatient status to the service of Dr Bridgett Guajardo   Follow-up Information    None         There are no discharge medications for this patient  No discharge procedures on file      ED Provider  Electronically Signed by           Lanie Lee DO  11/03/19 1650

## 2019-11-03 NOTE — ASSESSMENT & PLAN NOTE
· Secondary to aspiration pneumonia versus pneumonitis  · Chest x-ray with left-sided haziness  · Continue ceftriaxone/Flagyl  · Transition from non-rebreather to high-flow nasal cannula for saturations greater than 92%  · Incentive spirometry/pulmonary hygiene  · Repeat chest x-ray in the a m

## 2019-11-03 NOTE — SEPSIS NOTE
Sepsis Note   Fatemeh Jimenez 54 y o  male MRN: [id number]  Unit/Bed#: -02 Encounter: [id number]      qSOFA     Row Name 11/03/19 1645 11/03/19 1630 11/03/19 1600 11/03/19 1530 11/03/19 1515    Altered mental status GCS < 15              Respiratory Rate > / =22  0  0  0  0  0    Systolic BP < / =125  0  0  0  0  0    Q Sofa Score  0  0  0  0  0    Row Name 11/03/19 1430 11/03/19 1359             Altered mental status GCS < 15           Respiratory Rate > / =22  0  0       Systolic BP < / =070  0  0       Q Sofa Score  0  0           Initial Sepsis Screening     Row Name 11/03/19 1538                Is the patient's history suggestive of a new or worsening infection? (!) Yes (Proceed)  -VL        Suspected source of infection  pneumonia  -VL        Are two or more of the following signs & symptoms of infection both present and new to the patient? (!) Yes (Proceed)  -VL        Indicate SIRS criteria  Altered mental status; Tachypnea > 20 resp per min;Leukocytosis (WBC > 79849 IJL)  -VL        If the answer is yes to both questions, suspicion of sepsis is present          If severe sepsis is present AND tissue hypoperfusion perists in the hour after fluid resuscitation or lactate > 4, the patient meets criteria for SEPTIC SHOCK          Are any of the following organ dysfunction criteria present within 6 hours of suspected infection and SIRS criteria that are NOT considered to be chronic conditions? (!) Yes  -VL        Organ dysfunction  Lactate >/equal 4 0 mmol/L  -VL        Date of presentation of severe sepsis          Time of presentation of severe sepsis          Tissue hypoperfusion persists in the hour after crystalloid fluid administration, evidenced, by either:  * OR * Lactate level is greater to or equal 4 mmol/dL ( ___ mmol/dL in comment field)  -VL        Was hypotension present within one hour of the conclusion of crystalloid fluid administration?           Date of presentation of septic shock  11/03/19  -        Time of presentation of septic shock  1538  -          User Key  (r) = Recorded By, (t) = Taken By, (c) = Cosigned By    Initials Name Provider Type     Alysha Paniagua DO Physician               Default Flowsheet Data (last 720 hours)      Sepsis Reassess     Row Name 11/03/19 1656                   Repeat Volume Status and Tissue Perfusion Assessment Performed    Repeat Volume Status and Tissue Perfusion Assessment Performed  Yes  -SS           Volume Status and Tissue Perfusion Post Fluid Resuscitation * Must Document All *    Vital Signs Reviewed (HR, RR, BP, T)  Yes  -SS        Shock Index Reviewed  Yes  -SS        Arterial Oxygen Saturation Reviewed (POx, SaO2 or SpO2)  Yes (comment %)  -SS        Cardio  (!) Normal S1/S2; Tachycardia  -SS        Pulmonary  Normal effort; Other (comment) decreased left   -SS        Capillary Refill  Brisk  -SS        Peripheral Pulses  Radial  -SS        Peripheral Pulse  +3  -SS        Skin  Warm;Dry  -SS        Urine output assessed  None  -SS           *OR*   Intensive Monitoring- Must Document One of the Following Four *:    Vital Signs Reviewed          * Central Venous Pressure (CVP or RAP)          * Central Venous Oxygen (SVO2, ScvO2 or Oxygen saturation via central catheter)          * Bedside Cardiovascular US in IVC diameter and % collapse          * Passive Leg Raise OR Crystalloid Challenge            User Key  (r) = Recorded By, (t) = Taken By, (c) = Cosigned By    Initials Name Provider Type     Shannon Durham Nurse Practitioner

## 2019-11-03 NOTE — ED NOTES
Pt had coughing fit, expectorated elbow mariely, states he feels much better now  General patient appearance improved         Ethan Escobar RN  11/03/19 8744

## 2019-11-03 NOTE — ASSESSMENT & PLAN NOTE
· Likely pre renal  · IV fluid 30 cc/kilogram bolus if not taking oral start IV fluid infusion  · BMP in the a m    · Check UA  ·

## 2019-11-03 NOTE — PLAN OF CARE
Problem: Potential for Falls  Goal: Patient will remain free of falls  Description  INTERVENTIONS:  - Assess patient frequently for physical needs  -  Identify cognitive and physical deficits and behaviors that affect risk of falls    -  [place] fall precautions as indicated by assessment   - Educate patient/family on patient safety including physical limitations  - Instruct patient to call for assistance with activity based on assessment  - Modify environment to reduce risk of injury  - Consider OT/PT consult to assist with strengthening/mobility  Outcome: Progressing     Problem: Prexisting or High Potential for Compromised Skin Integrity  Goal: Skin integrity is maintained or improved  Description  INTERVENTIONS:  - Identify patients at risk for skin breakdown  - Assess and monitor skin integrity  - Assess and monitor nutrition and hydration status  - Monitor labs   - Assess for incontinence   - Turn and reposition patient  - Assist with mobility/ambulation  - Relieve pressure over bony prominences  - Avoid friction and shearing  - Provide appropriate hygiene as needed including keeping skin clean and dry  - Evaluate need for skin moisturizer/barrier cream  - Collaborate with interdisciplinary team   - Patient/family teaching  - Consider wound care consult   Outcome: Progressing     Problem: SUBSTANCE USE/ABUSE  Goal: Will have no detox symptoms and will verbalize plan for changing substance-related behavior  Description  INTERVENTIONS:  - Monitor physical status and assess for symptoms of withdrawal  - Administer medication as ordered  - Provide emotional support with 1 on 1 interaction with staff  - Encourage recovery focused program/ addiction education  - Assess for verbalization of changing behaviors related to substance abuse  - Initiate consults and referrals as appropriate (Case Management, Spiritual Care, etc )  Outcome: Progressing  Goal: By discharge, will develop insight into their chemical dependency and sustain motivation to continue in recovery  Description  INTERVENTIONS:  - Attends all daily group sessions and scheduled AA groups  - Actively practices coping skills through participation in the therapeutic community and adherence to program rules  - Reviews and completes assignments from individual treatment plan  - Assist patient development of understanding of their personal cycle of addiction and relapse triggers  Outcome: Progressing     Problem: RESPIRATORY - ADULT  Goal: Achieves optimal ventilation and oxygenation  Description  INTERVENTIONS:  - Assess for changes in respiratory status  - Assess for changes in mentation and behavior  - Position to facilitate oxygenation and minimize respiratory effort  - Oxygen administered by appropriate delivery if ordered  - Initiate smoking cessation education as indicated  - Encourage broncho-pulmonary hygiene including cough, deep breathe, Incentive Spirometry  - Assess the need for suctioning and aspirate as needed  - Assess and instruct to report SOB or any respiratory difficulty  - Respiratory Therapy support as indicated  Outcome: Progressing

## 2019-11-04 ENCOUNTER — APPOINTMENT (INPATIENT)
Dept: MRI IMAGING | Facility: HOSPITAL | Age: 49
DRG: 871 | End: 2019-11-04

## 2019-11-04 ENCOUNTER — APPOINTMENT (INPATIENT)
Dept: RADIOLOGY | Facility: HOSPITAL | Age: 49
DRG: 871 | End: 2019-11-04

## 2019-11-04 LAB
ANION GAP SERPL CALCULATED.3IONS-SCNC: 7 MMOL/L (ref 4–13)
ATRIAL RATE: 112 BPM
BASOPHILS # BLD AUTO: 0.03 THOUSANDS/ΜL (ref 0–0.1)
BASOPHILS NFR BLD AUTO: 0 % (ref 0–1)
BUN SERPL-MCNC: 16 MG/DL (ref 5–25)
CALCIUM SERPL-MCNC: 7.9 MG/DL (ref 8.3–10.1)
CHLORIDE SERPL-SCNC: 105 MMOL/L (ref 100–108)
CK MB SERPL-MCNC: 1.1 % (ref 0–2.5)
CK MB SERPL-MCNC: 4.5 NG/ML (ref 0–5)
CK SERPL-CCNC: 418 U/L (ref 39–308)
CO2 SERPL-SCNC: 25 MMOL/L (ref 21–32)
CREAT SERPL-MCNC: 1.09 MG/DL (ref 0.6–1.3)
EOSINOPHIL # BLD AUTO: 0.08 THOUSAND/ΜL (ref 0–0.61)
EOSINOPHIL NFR BLD AUTO: 1 % (ref 0–6)
ERYTHROCYTE [DISTWIDTH] IN BLOOD BY AUTOMATED COUNT: 14.5 % (ref 11.6–15.1)
GFR SERPL CREATININE-BSD FRML MDRD: 80 ML/MIN/1.73SQ M
GLUCOSE SERPL-MCNC: 137 MG/DL (ref 65–140)
GLUCOSE SERPL-MCNC: 96 MG/DL (ref 65–140)
GLUCOSE SERPL-MCNC: 99 MG/DL (ref 65–140)
HCT VFR BLD AUTO: 35.3 % (ref 36.5–49.3)
HGB BLD-MCNC: 11.5 G/DL (ref 12–17)
IMM GRANULOCYTES # BLD AUTO: 0.04 THOUSAND/UL (ref 0–0.2)
IMM GRANULOCYTES NFR BLD AUTO: 0 % (ref 0–2)
LYMPHOCYTES # BLD AUTO: 1.15 THOUSANDS/ΜL (ref 0.6–4.47)
LYMPHOCYTES NFR BLD AUTO: 10 % (ref 14–44)
MCH RBC QN AUTO: 30.7 PG (ref 26.8–34.3)
MCHC RBC AUTO-ENTMCNC: 32.6 G/DL (ref 31.4–37.4)
MCV RBC AUTO: 94 FL (ref 82–98)
MONOCYTES # BLD AUTO: 0.61 THOUSAND/ΜL (ref 0.17–1.22)
MONOCYTES NFR BLD AUTO: 6 % (ref 4–12)
NEUTROPHILS # BLD AUTO: 9.26 THOUSANDS/ΜL (ref 1.85–7.62)
NEUTS SEG NFR BLD AUTO: 83 % (ref 43–75)
NRBC BLD AUTO-RTO: 0 /100 WBCS
P AXIS: 72 DEGREES
PLATELET # BLD AUTO: 146 THOUSANDS/UL (ref 149–390)
PMV BLD AUTO: 10.8 FL (ref 8.9–12.7)
POTASSIUM SERPL-SCNC: 3.8 MMOL/L (ref 3.5–5.3)
PR INTERVAL: 138 MS
PROCALCITONIN SERPL-MCNC: 0.85 NG/ML
QRS AXIS: 97 DEGREES
QRSD INTERVAL: 84 MS
QT INTERVAL: 348 MS
QTC INTERVAL: 475 MS
RBC # BLD AUTO: 3.74 MILLION/UL (ref 3.88–5.62)
SODIUM SERPL-SCNC: 137 MMOL/L (ref 136–145)
T WAVE AXIS: 32 DEGREES
TROPONIN I SERPL-MCNC: 0.3 NG/ML
VENTRICULAR RATE: 112 BPM
WBC # BLD AUTO: 11.17 THOUSAND/UL (ref 4.31–10.16)

## 2019-11-04 PROCEDURE — G8978 MOBILITY CURRENT STATUS: HCPCS

## 2019-11-04 PROCEDURE — 82550 ASSAY OF CK (CPK): CPT | Performed by: NURSE PRACTITIONER

## 2019-11-04 PROCEDURE — 93010 ELECTROCARDIOGRAM REPORT: CPT | Performed by: INTERNAL MEDICINE

## 2019-11-04 PROCEDURE — G8987 SELF CARE CURRENT STATUS: HCPCS

## 2019-11-04 PROCEDURE — 84145 PROCALCITONIN (PCT): CPT | Performed by: INTERNAL MEDICINE

## 2019-11-04 PROCEDURE — G8979 MOBILITY GOAL STATUS: HCPCS

## 2019-11-04 PROCEDURE — 94660 CPAP INITIATION&MGMT: CPT

## 2019-11-04 PROCEDURE — 99221 1ST HOSP IP/OBS SF/LOW 40: CPT | Performed by: PHYSICIAN ASSISTANT

## 2019-11-04 PROCEDURE — 94760 N-INVAS EAR/PLS OXIMETRY 1: CPT

## 2019-11-04 PROCEDURE — 82948 REAGENT STRIP/BLOOD GLUCOSE: CPT

## 2019-11-04 PROCEDURE — 97163 PT EVAL HIGH COMPLEX 45 MIN: CPT

## 2019-11-04 PROCEDURE — 99232 SBSQ HOSP IP/OBS MODERATE 35: CPT | Performed by: INTERNAL MEDICINE

## 2019-11-04 PROCEDURE — 97167 OT EVAL HIGH COMPLEX 60 MIN: CPT

## 2019-11-04 PROCEDURE — 80048 BASIC METABOLIC PNL TOTAL CA: CPT | Performed by: NURSE PRACTITIONER

## 2019-11-04 PROCEDURE — 84484 ASSAY OF TROPONIN QUANT: CPT | Performed by: NURSE PRACTITIONER

## 2019-11-04 PROCEDURE — 71550 MRI CHEST W/O DYE: CPT

## 2019-11-04 PROCEDURE — 82553 CREATINE MB FRACTION: CPT | Performed by: NURSE PRACTITIONER

## 2019-11-04 PROCEDURE — 85025 COMPLETE CBC W/AUTO DIFF WBC: CPT | Performed by: NURSE PRACTITIONER

## 2019-11-04 PROCEDURE — G8988 SELF CARE GOAL STATUS: HCPCS

## 2019-11-04 PROCEDURE — 71045 X-RAY EXAM CHEST 1 VIEW: CPT

## 2019-11-04 RX ORDER — HYDROXYZINE HYDROCHLORIDE 25 MG/1
25 TABLET, FILM COATED ORAL EVERY 6 HOURS PRN
Status: DISCONTINUED | OUTPATIENT
Start: 2019-11-04 | End: 2019-11-05 | Stop reason: HOSPADM

## 2019-11-04 RX ORDER — HYDROXYZINE HYDROCHLORIDE 25 MG/1
50 TABLET, FILM COATED ORAL EVERY 6 HOURS PRN
Status: DISCONTINUED | OUTPATIENT
Start: 2019-11-04 | End: 2019-11-05 | Stop reason: HOSPADM

## 2019-11-04 RX ADMIN — HEPARIN SODIUM 5000 UNITS: 5000 INJECTION INTRAVENOUS; SUBCUTANEOUS at 22:52

## 2019-11-04 RX ADMIN — METRONIDAZOLE 500 MG: 500 INJECTION, SOLUTION INTRAVENOUS at 00:33

## 2019-11-04 RX ADMIN — NICOTINE 1 PATCH: 21 PATCH, EXTENDED RELEASE TRANSDERMAL at 09:30

## 2019-11-04 RX ADMIN — METRONIDAZOLE 500 MG: 500 INJECTION, SOLUTION INTRAVENOUS at 23:01

## 2019-11-04 RX ADMIN — METRONIDAZOLE 500 MG: 500 INJECTION, SOLUTION INTRAVENOUS at 16:06

## 2019-11-04 RX ADMIN — METRONIDAZOLE 500 MG: 500 INJECTION, SOLUTION INTRAVENOUS at 09:33

## 2019-11-04 RX ADMIN — HEPARIN SODIUM 5000 UNITS: 5000 INJECTION INTRAVENOUS; SUBCUTANEOUS at 05:04

## 2019-11-04 RX ADMIN — HEPARIN SODIUM 5000 UNITS: 5000 INJECTION INTRAVENOUS; SUBCUTANEOUS at 16:01

## 2019-11-04 RX ADMIN — CEFTRIAXONE 1000 MG: 1 INJECTION, SOLUTION INTRAVENOUS at 05:00

## 2019-11-04 NOTE — ASSESSMENT & PLAN NOTE
· Secondary to aspiration pneumonia versus pneumonitis  · Chest x-ray with left-sided haziness  · Continue ceftriaxone/Flagyl  · Transition from non-rebreather to high-flow nasal cannula for saturations greater than 92%  · Incentive spirometry/pulmonary hygiene  · Repeat chest x-ray done this a m   Shows improved aeration with decreased haziness on the L

## 2019-11-04 NOTE — ASSESSMENT & PLAN NOTE
· Secondary to aspiration pneumonia/pneumonitis  · Chest x-ray with left-sided haziness  · Patient was found in his car with vomit currently coughing up food particles  · Blood cultures pending  · Procalcitonin 0 60/UA negative  · Ceftriaxone/Flagyl

## 2019-11-04 NOTE — PROGRESS NOTES
Pt states weakness and numbness/decreased sensation in RUE  Sensation decreased in RUE from hand to elbow when compared to LUE when checked with pin prick  Above elbow on RUE pt states normal sensation  Blane BASS NP and Dr Ragini Guerrero aware

## 2019-11-04 NOTE — ASSESSMENT & PLAN NOTE
· Strength slightly weaker at 4/5  Sensation decreased in distal hand    · Concern for potential nerve injury  · Pulses palpable  · R wrist x-ray negative for acute fracture or dislocation

## 2019-11-04 NOTE — PLAN OF CARE
Problem: OCCUPATIONAL THERAPY ADULT  Goal: Performs self-care activities at highest level of function for planned discharge setting  See evaluation for individualized goals  Outcome: Progressing  Note:   Limitation: Decreased ADL status, Decreased UE ROM, Decreased UE strength, Decreased high-level ADLs, Non-func R UE  Prognosis: Fair  Assessment: Pt is a 54 y o  male seen for OT evaluation at [facility], admitted 11/3/2019 w/ Acute respiratory failure with hypoxia and hypercapnia ([address] )  OT completed extensive review of pt's medical and social history  Comorbidities affecting pt's functional performance at time of assessment include: accidental drug overdose (heroin), aspiration pneumonia, LETITIA, sepsis, right wrist pain  Personal factors affecting pt at time of IE include:limited home support and difficulty performing ADLS  Prior to admission, pt was living in apartment, alone, and was independent, driving, and working full time as an   Upon evaluation, pt presents to OT below baseline due to the following performance deficits: weakness, decreased ROM, decreased strength, decreased balance, decreased tolerance and orthopedic restrictions  Pt presents with significant weakness and lack of coordination to R hand/wrist  Pt also reports decreased sensation from the elbow down on the RUE  Pt unable to perform AROM with R hand/wrist  Edu on PROM utilizing other hand to continue to reduce edema/contracture  Pt is right handed and minimally able to use hand for fine motor tasks, demonstrating difficulty donning sock with B/L hands  Pt concerned 2* his work as an   Awaiting ortho consult  Pt to benefit from continued skilled OT tx while in the hospital to address deficits as defined above and maximize level of functional independence w ADL's and functional mobility   Occupational Performance areas to address include: grooming, bathing/shower, toilet hygiene, dressing, functional mobility and FMC/ROM/strength R hand  Based on findings, pt is of high complexity  At this time, OT recommendations at time of discharge are outpatient OT       OT Discharge Recommendation: Outpatient OT  OT - OK to Discharge: (when medically stable with outpt OT)

## 2019-11-04 NOTE — ASSESSMENT & PLAN NOTE
· Likely pre renal  · IV fluid 30 cc/kilogram bolus given   · Started on regular diet  · BMP pending    · Check UA   · Repeat CK pending

## 2019-11-04 NOTE — PHYSICAL THERAPY NOTE
PT eval   11/04/19 0915   Note Type   Note type Eval only   Pain Assessment   Pain Assessment No/denies pain   Pain Score No Pain   Pain Type   (R wrist numbness and tingling)   Home Living   Type of [address] [place] [address]   (none)   Prior Function   Level of [place] Independent with ADLs and functional mobility   Lives With Alone   Receives Help From Family   ADL Assistance Independent   IADLs Independent   Falls in the last 6 months 1 to 4   Vocational Full time employment   Comments    Restrictions/Precautions   Other Precautions Telemetry;Multiple lines   General   Additional Pertinent History found unresponsive adn with vomit in car after using heroin; suspected aspiration, was on hi flow 02 now on room air, R wrist numbness and weakness ? from pressure?nerve palsey   Family/Caregiver Present No   Cognition   Overall Cognitive Status WFL   Arousal/Participation Alert   Orientation Level Oriented X4   Memory Within functional limits   Following Commands Follows all commands and directions without difficulty   Comments has no been up yet   RUE Assessment   RUE Assessment   (wfle except R wrist extension w/ weakned  see OT)   LUE Assessment   LUE Assessment WFL   RLE Assessment   RLE Assessment WFL   LLE Assessment   LLE Assessment WFL   Coordination   Movements are Fluid and Coordinated 1   Sensation   (decreased R wrist/hand)   Bed Mobility   Rolling R 7  Independent   Rolling L 7  Independent   Supine to Sit 7  Independent   Sit to Supine 7  Independent   Transfers   Sit to Stand 7  Independent   Stand to Sit 5  Supervision   Additional items Verbal cues   Stand pivot 5  Supervision   Ambulation/Elevation   Gait pattern Narrow ERNESTO; Inconsistent leslie; Shuffling   Gait Assistance 4  Minimal assist   Additional items Assist x 1   Assistive Device None   Distance 5'   Balance   Static Sitting Normal   Dynamic Sitting Normal   Static Standing Good   Dynamic Standing [address] Fair   Endurance Deficit   Endurance Deficit No  (-117;02 sat 96% on room air)   Activity Tolerance   Activity Tolerance Patient tolerated treatment well   Nurse Made Aware yes Earl RN   Assessment   Prognosis Good   Problem List Decreased strength;Decreased range of motion; Impaired balance;Decreased mobility   Assessment Pt with slight unsteadiness after first oob since adm for heroin OD adn aspiration  )@ sat 96% on room air  R wrist numbness tingling and weakness  May benefit from wrist splint  Recommend Ortho consult adn out-pt OT  will see for 1 additional session for further gait training longer distances   See goals   Barriers to Discharge   (medical status)   Goals   Patient Goals get better  get my wrist back   STG Expiration Date 11/05/19   Short Term Goal #1 1) safe ind amb without ' level   Plan   Treatment/Interventions Gait training   PT Frequency Follow-up visit only   Recommendation   Recommendation   (out-pt OT for r wrist/hand)   Barthel Index   Feeding 5   Bathing 0   Grooming Score 5   Dressing Score 5   Bladder Score 10   Bowels Score 10   Toilet Use Score 10   Transfers (Bed/Chair) Score 10   Mobility (Level Surface) Score 0   Stairs Score 0   Barthel Index Score 55   Inez De La Paz, PT

## 2019-11-04 NOTE — PLAN OF CARE
Problem: Potential for Falls  Goal: Patient will remain free of falls  Description  INTERVENTIONS:  - Assess patient frequently for physical needs  -  Identify cognitive and physical deficits and behaviors that affect risk of falls    -  [place] fall precautions as indicated by assessment   - Educate patient/family on patient safety including physical limitations  - Instruct patient to call for assistance with activity based on assessment  - Modify environment to reduce risk of injury  - Consider OT/PT consult to assist with strengthening/mobility  Outcome: Progressing     Problem: Prexisting or High Potential for Compromised Skin Integrity  Goal: Skin integrity is maintained or improved  Description  INTERVENTIONS:  - Identify patients at risk for skin breakdown  - Assess and monitor skin integrity  - Assess and monitor nutrition and hydration status  - Monitor labs   - Assess for incontinence   - Turn and reposition patient  - Assist with mobility/ambulation  - Relieve pressure over bony prominences  - Avoid friction and shearing  - Provide appropriate hygiene as needed including keeping skin clean and dry  - Evaluate need for skin moisturizer/barrier cream  - Collaborate with interdisciplinary team   - Patient/family teaching  - Consider wound care consult   Outcome: Progressing     Problem: SUBSTANCE USE/ABUSE  Goal: Will have no detox symptoms and will verbalize plan for changing substance-related behavior  Description  INTERVENTIONS:  - Monitor physical status and assess for symptoms of withdrawal  - Administer medication as ordered  - Provide emotional support with 1 on 1 interaction with staff  - Encourage recovery focused program/ addiction education  - Assess for verbalization of changing behaviors related to substance abuse  - Initiate consults and referrals as appropriate (Case Management, Spiritual Care, etc )  Outcome: Progressing  Goal: By discharge, will develop insight into their chemical dependency and sustain motivation to continue in recovery  Description  INTERVENTIONS:  - Attends all daily group sessions and scheduled AA groups  - Actively practices coping skills through participation in the therapeutic community and adherence to program rules  - Reviews and completes assignments from individual treatment plan  - Assist patient development of understanding of their personal cycle of addiction and relapse triggers  Outcome: Progressing     Problem: RESPIRATORY - ADULT  Goal: Achieves optimal ventilation and oxygenation  Description  INTERVENTIONS:  - Assess for changes in respiratory status  - Assess for changes in mentation and behavior  - Position to facilitate oxygenation and minimize respiratory effort  - Oxygen administered by appropriate delivery if ordered  - Initiate smoking cessation education as indicated  - Encourage broncho-pulmonary hygiene including cough, deep breathe, Incentive Spirometry  - Assess the need for suctioning and aspirate as needed  - Assess and instruct to report SOB or any respiratory difficulty  - Respiratory Therapy support as indicated  Outcome: Progressing

## 2019-11-04 NOTE — CONSULTS
Consultation - [address] [place] [address] 54 y o  male MRN: [id number]  Unit/Bed#: -02 Encounter: [id number]    Assessment/Plan     Assessment:  Mood disorder  Episodic opiate abuse    Plan:   1  Patient did not meet criteria for inpatient psychiatric hospitalization  2  Will add Atarax 25-50mg q6h PRN anxiety  3  Will not initiate antidepressant due to no follow-up in community and does not meet criteria for MDD  4   Was interested in Naltrexone for opiate use prevention but would require 1 week free of opiates  5  Due to not having insurance will most likely need [facility] for inpatient rehab  Patient was interested in the option of inpatient versus outpatient rehab services  Defer this to case management  6   Psychiatry signing off or follow-up PRN  Risks, benefits and possible side effects of Medications:   Risks, benefits, and possible side effects of medications explained to patient and patient verbalizes understanding  Chief Complaint: "I abused heroin"    History of Present Illness   Physician Requesting Consult: Mattei Woods MD  Reason for Consult / Principal Problem: Heroin abuse    Patient is a 45-year-old male presented to [address] [place] [address] ED after apparent unintentional overdose of heroin  Patient states that he does not frequently abused opiates and has been prescribed opiates in the past due to knee and chronic back pain  After not getting medications anymore he then let his insurance lapse where he then purchased opiates off the streets  Reported infrequent abuse of opiates  This time he used heroin which ultimately led to unintentional overdose  Patient adamantly denies overdose as intentional   Currently is possibly homeless at this time as well  Mother was in room in appears supportive  She encouraged inpatient rehab  Patient did report having anxiety at times with feelings of impending panic attacks    Patient reported racing thoughts at times, excessive worrying, and muscle tension  He did report depressive symptoms of lack of energy, inability concentrate, anhedonia, but denied other depressive symptoms  He adamantly denied suicidal thoughts and reported never having suicidal thoughts in the past   He denied history of lindsey  He denied psychosis and delusional material   He denied history of trauma  Patient was overall cooperative but appears somewhat guarded  Psychosocial Stressors: drug abuse, possible homelessness  Consults    Psychiatric Review Of Systems:  sleep: no  appetite changes: no  weight changes: no  energy/anergy: yes  interest/pleasure/anhedonia: yes  somatic symptoms: no  anxiety/panic: yes  lindsey: no  guilty/hopeless: no  self injurious behavior/risky behavior: no    Historical Information   Past Psychiatric History:   None  Currently in treatment with noone  Past Suicide attempts: denied  Past Violent behavior: denied  Past Psychiatric medication trial: denied    Substance Abuse History:  Heroin, others in past but did not mention  Use of Alcohol: occasional    Longest clean time: 1 5 years  History of IP/OP rehabilitation program: denied  Smoking history: denied  Use of Caffeine: occasional    Family Psychiatric History:   Denied    Social History  Education: high school diploma/GED  Learning Disabilities: none  Marital history: single  Living arrangement, social support: lives with father    Occupational History: employed as [employer]  Functioning Relationships:  Parents appear supportive  Other Pertinent History: None    Traumatic History:   Abuse: denied  Other Traumatic Events: denied    Past Medical History:   Diagnosis Date    Heroin use        Medical Review Of Systems:  Review of Systems    Meds/Allergies   all current active meds have been reviewed and current meds:   Current Facility-Administered Medications   Medication Dose Route Frequency    acetaminophen (TYLENOL) tablet 650 mg  650 mg Oral Q6H PRN    cefTRIAXone (ROCEPHIN) IVPB (premix) 1,000 mg  1,000 mg Intravenous Q24H    heparin (porcine) subcutaneous injection 5,000 Units  5,000 Units Subcutaneous Q8H [address]    hydrOXYzine HCL (ATARAX) tablet 25 mg  25 mg Oral Q6H PRN    hydrOXYzine HCL (ATARAX) tablet 50 mg  50 mg Oral Q6H PRN    insulin lispro (HumaLOG) 100 units/mL subcutaneous injection 1-5 Units  1-5 Units Subcutaneous HS    insulin lispro (HumaLOG) 100 units/mL subcutaneous injection 1-6 Units  1-6 Units Subcutaneous TID AC    metroNIDAZOLE (FLAGYL) IVPB (premix) 500 mg  500 mg Intravenous Q8H    nicotine (NICODERM CQ) 21 mg/24 hr TD 24 hr patch 1 patch  1 patch Transdermal Daily    sodium chloride 0 9 % bolus 1,000 mL  1,000 mL Intravenous Once     No Known Allergies    Objective   Vital signs in last 24 hours:  Temp:  [98 3 °F (36 8 °C)-99 °F (37 2 °C)] 98 4 °F (36 9 °C)  HR:  [] 97  Resp:  [16-23] 16  BP: (108-129)/(63-81) 108/74  FiO2 (%):  [60-80] 60      Intake/Output Summary (Last 24 hours) at 11/4/2019 1528  Last data filed at 11/4/2019 0501  Gross per 24 hour   Intake 2150 ml   Output 2020 ml   Net 130 ml       Mental Status Evaluation:  Appearance:  casually dressed   Behavior:  guarded   Speech:  normal pitch and normal volume   Mood:  anxious   Affect:  constricted   Language: appropriate   Thought Process:  normal   Thought Content:  normal   Perceptual Disturbances: None   Risk Potential: Denied SI/HI    Potential for aggression: No   Sensorium:  person, place and time/date   Cognition:  grossly intact   Consciousness:  alert and awake    Attention: attention span and concentration were age appropriate   Intellect: within normal limits   Fund of Knowledge: awareness of current events: yes   Insight:  fair   Judgment: fair   Gait/Station: did not assess   Motor Activity: no abnormal movements     Lab Results: reviewed  Imaging Studies: reviewed  EKG, Pathology, and Other Studies: reviewed    Code Status: Level 1 - Full Code    Yessenia Bass PA-C

## 2019-11-04 NOTE — PROGRESS NOTES
Progress Note - Shirley Galloway 1970, 54 y o  male MRN: [id number]    Unit/Bed#: -02 Encounter: [id number]    Primary Care Provider: No primary care provider on file  Date and time admitted to hospital: 11/3/2019  2:03 PM        Tobacco abuse  Assessment & Plan  · Nicotine patch    Right wrist pain  Assessment & Plan  · Strength slightly weaker at 4/5  Sensation decreased in distal hand  · Concern for potential nerve injury  · Pulses palpable  · R wrist x-ray negative for acute fracture or dislocation    Severe sepsis ([facility] )  Assessment & Plan  · Secondary to aspiration pneumonia/pneumonitis  · Chest x-ray with left-sided haziness  · Patient was found in his car with vomit currently coughing up food particles  · Blood cultures pending  · Procalcitonin 0 60/UA negative  · Ceftriaxone/Flagyl    LETITIA (acute kidney injury) ([facility] )  Assessment & Plan  · Likely pre renal  · IV fluid 30 cc/kilogram bolus given   · Started on regular diet  · BMP pending  · Check UA   · Repeat CK pending    Hyperglycemia  Assessment & Plan  · HgbA1c 6 3  · Sliding-scale insulin    Lactic acidosis  Assessment & Plan  · 5 2  · 30 cc/kilos IV fluids given   · Normalized at 2 0    Aspiration pneumonitis (HCC)  Assessment & Plan  · As above    Elevated CK  Assessment & Plan  · At 344 Mild  · Volume resuscitation   · A m  CK    Elevated troponin  Assessment & Plan  · Troponin peaked at 0 32  No longer trending    · EKG without acute changes    Accidental drug overdose  Assessment & Plan  · Patient admits to snorting 1 bag of heroin  · Denies any suicidal ideation  · Cessation education  · Monitor for withdrawal symptoms  · Patient states he uses only occasionally last use was greater than 1 month ago  · Coma panel negative    * Acute respiratory failure with hypoxia and hypercapnia (HCC)  Assessment & Plan  · Secondary to aspiration pneumonia versus pneumonitis  · Chest x-ray with left-sided haziness  · Continue ceftriaxone/Flagyl  · Transition from non-rebreather to high-flow nasal cannula for saturations greater than 92%  · Incentive spirometry/pulmonary hygiene  · Repeat chest x-ray done this a m  Shows improved aeration with decreased haziness on the L       VTE Pharmacologic Prophylaxis:   Pharmacologic: Heparin  Mechanical VTE Prophylaxis in Place: Yes    Patient Centered Rounds: I have performed bedside rounds with nursing staff today  Discussions with Specialists or Other Care Team Provider: No    Education and Discussions with Family / Patient: None    Time Spent for Care: 30 minutes  More than 50% of total time spent on counseling and coordination of care as described above  Current Length of Stay: 1 day(s)    Current Patient Status: Inpatient   Certification Statement: The patient will continue to require additional inpatient hospital stay due to oxygen requirements    Discharge Plan: Pt can be discharged home once stable    Code Status: Level 1 - Full Code      Subjective:   Pt states that he's feeling better  Denies c/o chest pain or SOB  Objective:     Vitals:   Temp (24hrs), Av 1 °F (36 7 °C), Min:95 3 °F (35 2 °C), Max:99 °F (37 2 °C)    Temp:  [95 3 °F (35 2 °C)-99 °F (37 2 °C)] 98 7 °F (37 1 °C)  HR:  [] 87  Resp:  [12-23] 18  BP: (113-178)/(63-85) 120/63  SpO2:  [90 %-100 %] 94 %  Body mass index is 26 7 kg/m²  Input and Output Summary (last 24 hours): Intake/Output Summary (Last 24 hours) at 2019 0643  Last data filed at 2019 0501  Gross per 24 hour   Intake 2150 ml   Output 2020 ml   Net 130 ml       Physical Exam:     Physical Exam   Constitutional: He is oriented to person, place, and time  He appears well-developed and well-nourished  No distress  HENT:   Head: Normocephalic and atraumatic  Eyes: Pupils are equal, round, and reactive to light  EOM are normal    Neck: Normal range of motion  Neck supple     Cardiovascular: Normal rate, regular rhythm, normal heart sounds and intact distal pulses  Exam reveals no gallop and no friction rub  No murmur heard  Pulmonary/Chest: Effort normal  No respiratory distress  He has no wheezes  He has rales  Abdominal: Soft  Bowel sounds are normal  He exhibits no distension  There is no tenderness  Musculoskeletal: Normal range of motion  He exhibits no edema  Neurological: He is alert and oriented to person, place, and time  Skin: Skin is warm and dry  Psychiatric: He has a normal mood and affect  Judgment normal    Nursing note and vitals reviewed  Additional Data:     Labs:    Results from last 7 days   Lab Units 11/04/19  0546 11/03/19  1407   WBC Thousand/uL 11 17* 20 04*   HEMOGLOBIN g/dL 11 5* 13 3   HEMATOCRIT % 35 3* 42 5   PLATELETS Thousands/uL 146* 211   BANDS PCT %  --  3   NEUTROS PCT % 83*  --    LYMPHS PCT % 10*  --    LYMPHO PCT %  --  6*   MONOS PCT % 6  --    MONO PCT %  --  1*   EOS PCT % 1 0     Results from last 7 days   Lab Units 11/03/19  1607 11/03/19  1407   SODIUM mmol/L  --  138   POTASSIUM mmol/L  --  4 8   CHLORIDE mmol/L  --  101   CO2 mmol/L  --  23   CO2, I-STAT mmol/L 22  --    BUN mg/dL  --  26*   CREATININE mg/dL  --  1 52*   ANION GAP mmol/L  --  14*   CALCIUM mg/dL  --  8 5   ALBUMIN g/dL  --  3 8   TOTAL BILIRUBIN mg/dL  --  0 50   ALK PHOS U/L  --  72   ALT U/L  --  32   AST U/L  --  29   GLUCOSE RANDOM mg/dL  --  220*     Results from last 7 days   Lab Units 11/03/19  1431   INR  1 01     Results from last 7 days   Lab Units 11/03/19  2130 11/03/19  1705   POC GLUCOSE mg/dl 91 61*     Results from last 7 days   Lab Units 11/03/19  1407   HEMOGLOBIN A1C % 6 3     Results from last 7 days   Lab Units 11/03/19  1816 11/03/19  1620 11/03/19  1431   LACTIC ACID mmol/L  --  2 0 5 2*   PROCALCITONIN ng/ml 0 60*  --   --            * I Have Reviewed All Lab Data Listed Above  * Additional Pertinent Lab Tests Reviewed:  All Labs Within Last 24 Hours Reviewed    Imaging:    Imaging Reports Reviewed Today Include: CXR  Imaging Personally Reviewed by Myself Includes:  Improved haziness on Left with improved aeration    Recent Cultures (last 7 days):           Last 24 Hours Medication List:     Current Facility-Administered Medications:  acetaminophen 650 mg Oral Q6H PRN GONZALEZ Esqueda    cefTRIAXone 1,000 mg Intravenous Q24H GONZALEZ Esqueda Last Rate: 1,000 mg (11/04/19 0500)   heparin (porcine) 5,000 Units Subcutaneous Q8H [address] GONZALEZ Chance    insulin lispro 1-5 Units Subcutaneous HS GONZALEZ Chance    insulin lispro 1-6 Units Subcutaneous TID AC GONZALEZ Chance    metroNIDAZOLE 500 mg Intravenous Q8H GONZALEZ Chance Last Rate: 500 mg (11/04/19 0033)   nicotine 1 patch Transdermal Daily GONZALEZ Chance    sodium chloride 1,000 mL Intravenous Once GONZALEZ Esqueda         Today, Patient Was Seen By: GONZALEZ Lopez    ** Please Note: Dictation voice to text software may have been used in the creation of this document   **

## 2019-11-04 NOTE — OCCUPATIONAL THERAPY NOTE
Occupational Therapy Evaluation      Kaelyn Dotson    11/4/2019    Principal Problem:    Acute respiratory failure with hypoxia and hypercapnia (HCC)  Active Problems:    Accidental drug overdose    Elevated troponin    Elevated CK    Aspiration pneumonitis (HCC)    Lactic acidosis    Hyperglycemia    LETITIA (acute kidney injury) ([facility] [address] )    Severe sepsis (HCC)    Right wrist pain    Tobacco abuse      Past Medical History:   Diagnosis Date    Heroin use        History reviewed  No pertinent surgical history  11/04/19 0916   Note Type   Note type Eval only   Restrictions/Precautions   Other Precautions Telemetry;Multiple lines   Pain Assessment   Pain Assessment No/denies pain   Home Living   Additional Comments Pt reported to therapists that he lives alone in an apartment with 0STE; Per CM, pt reports he lives with his father in a [facility]  Prior Function   Level of [place] Independent with ADLs and functional mobility   Lives With Alone; Family   Receives Help From Family   ADL Assistance Independent   IADLs Independent   Falls in the last 6 months 1 to 4   Vocational Full time employment  ()   Comments +   Lifestyle   Autonomy Pt reports independence, working full time prior to accidental heroin overdose  Since he is feeling better but now has a new onset of R UE deficits     Psychosocial   Psychosocial (WDL) WDL   Subjective   Subjective Pt reports numbness/weakness with R hand/forearm   ADL   Eating Assistance 5  Supervision/Setup   Grooming Assistance 5  Supervision/Setup   UB Bathing Assistance 5  Supervision/Setup   LB Bathing Assistance 5  Supervision/Setup   UB Dressing Assistance 5  Supervision/Setup   LB Dressing Assistance 5  Supervision/Setup   Toileting Assistance  5  Supervision/Setup   Additional Comments Pt has deficits in R hand (dominant)   Bed Mobility   Supine to Sit 7  Independent   Sit to Supine 7  Independent   Transfers   Sit to Stand 7  Independent   Stand to Sit 5 Supervision   Stand pivot 5  Supervision   Functional Mobility   Functional Mobility 5  Supervision   Activity Tolerance   Activity Tolerance Patient tolerated treatment well   Medical Staff Made Aware PT Inez   Nurse Made Aware RN Regina Hoff   RUE Assessment   RUE Assessment   (see assessment)   LUE Assessment   LUE Assessment WFL   Hand Function   Gross Motor Coordination Functional   Fine Motor Coordination Impaired   Cognition   Overall Cognitive Status WFL   Arousal/Participation Alert; Cooperative   Attention Within functional limits   Orientation Level Oriented X4   Memory Within functional limits   Following Commands Follows all commands and directions without difficulty   Assessment   Limitation Decreased ADL status; Decreased UE ROM; Decreased UE strength;Decreased high-level ADLs; Non-func R UE   Prognosis Fair   Assessment Pt is a 54 y o  male seen for OT evaluation at [facility], admitted 11/3/2019 w/ Acute respiratory failure with hypoxia and hypercapnia ([facility] [address] )  OT completed extensive review of pt's medical and social history  Comorbidities affecting pt's functional performance at time of assessment include: accidental drug overdose (heroin), aspiration pneumonia, LETITIA, sepsis, right wrist pain  Personal factors affecting pt at time of IE include:limited home support and difficulty performing ADLS  Prior to admission, pt was living in apartment, alone, and was independent, driving, and working full time as an   Upon evaluation, pt presents to OT below baseline due to the following performance deficits: weakness, decreased ROM, decreased strength, decreased balance, decreased tolerance and orthopedic restrictions  Pt presents with significant weakness and lack of coordination to R hand/wrist  Pt also reports decreased sensation from the elbow down on the RUE  Pt unable to perform AROM with R hand/wrist  Edu on PROM utilizing other hand to continue to reduce edema/contracture   Pt is right handed and minimally able to use hand for fine motor tasks, demonstrating difficulty donning sock with B/L hands  Pt concerned 2* his work as an   Awaiting ortho consult  Pt to benefit from continued skilled OT tx while in the hospital to address deficits as defined above and maximize level of functional independence w ADL's and functional mobility  Occupational Performance areas to address include: grooming, bathing/shower, toilet hygiene, dressing, functional mobility and FMC/ROM/strength R hand  Based on findings, pt is of high complexity  At this time, OT recommendations at time of discharge are outpatient OT  Goals   Patient Goals Get hand better   Plan   Treatment Interventions ADL retraining;UE strengthening/ROM; Patient/family training;Neuromuscular reeducation; Fine motor coordination activities; Compensatory technique education;UE splinting;Continued evaluation   Goal Expiration Date 11/14/19   OT Frequency 2-3x/wk   Recommendation   OT Discharge Recommendation Outpatient OT   OT - OK to Discharge   (when medically stable with outpt OT)   Barthel Index   Feeding 5   Bathing 0   Grooming Score 0   Dressing Score 5   Bladder Score 10   Bowels Score 10   Toilet Use Score 10   Transfers (Bed/Chair) Score 10   Mobility (Level Surface) Score 0   Stairs Score 0   Barthel Index Score 50     Pt will achieve the following goals within 10 days  *Pt will complete grooming with independence  *Pt will complete UB bathing and dressing with independence  *Pt will complete LB bathing and dressing with independence   *Pt will complete toileting (hygiene and clothing management) with independence    *Pt will perform functional transfers with independence in order to complete ADL routine  *Pt will complete item retrieval and light home management with modified independence while demonstrating good safety  *Pt will demonstrate increased activity tolerance in order to complete ADL routine      *Pt will participate in UE therapeutic exercise for R hand      [employer] [place], OT

## 2019-11-04 NOTE — ASSESSMENT & PLAN NOTE
· Patient admits to snorting 1 bag of heroin  · Denies any suicidal ideation  · Cessation education  · Monitor for withdrawal symptoms  · Patient states he uses only occasionally last use was greater than 1 month ago  · Coma panel negative

## 2019-11-04 NOTE — PLAN OF CARE
Problem: PHYSICAL THERAPY ADULT  Goal: Performs mobility at highest level of function for planned discharge setting  See evaluation for individualized goals  Description  Treatment/Interventions: Gait training          See flowsheet documentation for full assessment, interventions and recommendations  Outcome: Progressing  Note:   Prognosis: Good  Problem List: Decreased strength, Decreased range of motion, Impaired balance, Decreased mobility  Assessment: Pt with slight unsteadiness after first oob since adm for heroin OD adn aspiration  )@ sat 96% on room air  R wrist numbness tingling and weakness  May benefit from wrist splint  Recommend Ortho consult adn out-pt OT  will see for 1 additional session for further gait training longer distances  See goals  Barriers to Discharge: (medical status)     Recommendation: (out-pt OT for r wrist/hand)          See flowsheet documentation for full assessment

## 2019-11-04 NOTE — UTILIZATION REVIEW
Initial Clinical Review    Admission: Date/Time/Statement: Inpatient Admission Orders (From admission, onward)     Ordered        11/03/19 1542  Inpatient Admission  Once                   Orders Placed This Encounter   Procedures    Inpatient Admission     Standing Status:   Standing     Number of Occurrences:   1     Order Specific Question:   Admitting Physician     Answer:   Beuford Ring     Order Specific Question:   Level of Care     Answer:   Level 2 Stepdown / HOT [14]     Order Specific Question:   Estimated length of stay     Answer:   More than 2 Midnights     Order Specific Question:   Certification     Answer:   I certify that inpatient services are medically necessary for this patient for a duration of greater than two midnights  See H&P and MD Progress Notes for additional information about the patient's course of treatment  ED Arrival Information     Expected Arrival Acuity Means of Arrival Escorted By Service Admission Type    - 11/3/2019 13:57 Emergent Ambulance [facility] Emergency    Arrival Complaint    Drug Overdose        Chief Complaint   Patient presents with    Overdose - Accidental     Pt found by his father slumped over in his car with the windows down, possibly there overnight  Pt has a known history of drug abuse  Assessment/Plan: This is a 54year old male from home to ED via ems  admitted to inpatient due to sepsis/acute respiratory failure with hypoxia and hypercapnia  Presented  Due to being found unresponsive in care with windows down, covered in vomit  Upon EMS arrival, patient unresponsive, pinpoint pupils, agonal respirations, given Narcan 0 4 mg with minimal improvement, given additional 0 4 mg and woke up combative, tremulous and retching, given ativan en route  Admits to recreational heroin  On exam has vomitus surrounding mouth  Tachypnea  Decreased breath sounds  WBC 20 04,  PH 7 24, PO2- 64  Lactic acid 5 2  Bun 26  Creatinine 1 52  Glucose 220  Anion gap 14  CxR showed infiltrate  In ED - oxygen non re breather transitioned to Vapotherm; bolus with IVF  IV antibiotics  And oxygen titration in progress  On 11/4/2019 -  Has right arm weakness and paraesthesias with right wrist pain  Suspect secondary to nerve compression if laid on right side  Initial imaging no acute findings, for MRI  Has aspiration pneumonia  Continue IV antibiotics, wean oxygen as able  ED Triage Vitals   Temperature Pulse Respirations Blood Pressure SpO2   11/03/19 1359 11/03/19 1359 11/03/19 1359 11/03/19 1359 11/03/19 1359   (!) 95 3 °F (35 2 °C) (!) 113 12 (!) 178/85 93 %      Temp Source Heart Rate Source Patient Position - Orthostatic VS BP Location FiO2 (%)   11/03/19 1359 11/03/19 1359 11/03/19 1359 11/03/19 1359 11/03/19 1705   Rectal Monitor Lying Right arm 80      Pain Score       11/03/19 1359       No Pain        Wt Readings from Last 1 Encounters:   11/03/19 84 4 kg (186 lb 1 1 oz)     Additional Vital Signs:   1/04/19 0337  98 7 °F (37 1 °C)  93  23Abnormal   120/63    98 %  High flow nasal cannula  Lying   11/03/19 2300  99 °F (37 2 °C)  90  16  113/73    97 %  High flow nasal cannula  Lying   11/03/19 2000    101  18      98 %       11/03/19 1900  99 °F (37 2 °C)  106Abnormal   20  118/68  106  100 %  High flow nasal cannula  Lying   11/03/19 1655  98 5 °F (36 9 °C)  100  20  124/80    100 %  Nasal cannula   Lying   O2 Device: high flow at 11/03/19 1655   11/03/19 1600  98 3 °F (36 8 °C)  104  21  118/72    93 %  Non-rebreather mask  Lying   11/03/19 1515    103  15  123/71    92 %  Non-rebreather mask  Lying   11/03/19 1430    102  15  134/73    95 %  Non-rebreather          Pertinent Labs/Diagnostic Test Results:   11/3/2019 EKG - sinus tachycardia    Normal ST and T    11/3/2019 CxR - Slight volume loss on the left   Increased patchy density in the left lung field    Favor underlying developing atelectasis, infiltrate is not excluded    11/3/2019 Xray right wrist - No acute osseous abnormality    11/4/2019 CxR - No acute cardiopulmonary disease  Results from last 7 days   Lab Units 11/04/19  0546 11/03/19  1407   WBC Thousand/uL 11 17* 20 04*   HEMOGLOBIN g/dL 11 5* 13 3   HEMATOCRIT % 35 3* 42 5   PLATELETS Thousands/uL 146* 211   NEUTROS ABS Thousands/µL 9 26*  --    BANDS PCT %  --  3         Results from last 7 days   Lab Units 11/04/19  0546 11/03/19  1607 11/03/19  1407   SODIUM mmol/L 137  --  138   POTASSIUM mmol/L 3 8  --  4 8   CHLORIDE mmol/L 105  --  101   CO2 mmol/L 25  --  23   CO2, I-STAT mmol/L  --  22  --    ANION GAP mmol/L 7  --  14*   BUN mg/dL 16  --  26*   CREATININE mg/dL 1 09  --  1 52*   EGFR ml/min/1 73sq m 80  --  54   CALCIUM mg/dL 7 9*  --  8 5     Results from last 7 days   Lab Units 11/03/19  1407   AST U/L 29   ALT U/L 32   ALK PHOS U/L 72   TOTAL PROTEIN g/dL 7 3   ALBUMIN g/dL 3 8   TOTAL BILIRUBIN mg/dL 0 50     Results from last 7 days   Lab Units 11/04/19  1218 11/04/19  0800 11/03/19  2130 11/03/19  1705   POC GLUCOSE mg/dl 137 99 91 61*     Results from last 7 days   Lab Units 11/04/19  0546 11/03/19  1407   GLUCOSE RANDOM mg/dL 96 220*         Results from last 7 days   Lab Units 11/03/19  1407   HEMOGLOBIN A1C % 6 3   EAG mg/dl 134     Results from last 7 days   Lab Units 11/03/19  1607   I STAT BASE EXC mmol/L -6*   I STAT O2 SAT % 88*   ISTAT PH ART  7 245*   I STAT ART PCO2 mm HG 48 2*   I STAT ART PO2 mm HG 64 0*   I STAT ART HCO3 mmol/L 20 9*     Results from last 7 days   Lab Units 11/04/19  0546 11/03/19  1407   CK TOTAL U/L 418* 344*   CK MB INDEX % 1 1 1 3   CK MB ng/mL 4 5 4 6     Results from last 7 days   Lab Units 11/04/19  0042 11/03/19  2106 11/03/19  1816 11/03/19  1407   TROPONIN I ng/mL 0 30* 0 32* 0 31* 0 09*         Results from last 7 days   Lab Units 11/03/19  1431   PROTIME seconds 13 0   INR  1 01   PTT seconds 27     Results from last 7 days   Lab Units 11/03/19  1407   TSH 3RD GENERATON uIU/mL 0 498     Results from last 7 days   Lab Units 11/04/19  0546 11/03/19  1816   PROCALCITONIN ng/ml 0 85* 0 60*     Results from last 7 days   Lab Units 11/03/19  1620 11/03/19  1431   LACTIC ACID mmol/L 2 0 5 2*     Results from last 7 days   Lab Units 11/03/19  1810   CLARITY UA  Clear   COLOR UA  Yellow   SPEC GRAV UA  1 025   PH UA  5 5   GLUCOSE UA mg/dl 250 (1/4%)*   KETONES UA mg/dl Negative   BLOOD UA  Negative   PROTEIN UA mg/dl Negative   NITRITE UA  Negative   BILIRUBIN UA  Negative   UROBILINOGEN UA E U /dl 0 2   LEUKOCYTES UA  Negative         Results from last 7 days   Lab Units 11/03/19  1809   AMPH/METH  Negative   BARBITURATE UR  Negative   BENZODIAZEPINE UR  Negative   COCAINE UR  Negative   METHADONE URINE  Negative   OPIATE UR  Negative   PCP UR  Negative   THC UR  Negative     Results from last 7 days   Lab Units 11/03/19  1407   ETHANOL LVL mg/dL <3   ACETAMINOPHEN LVL ug/mL <9 0*   SALICYLATE LVL mg/dL 3 1     Results from last 7 days   Lab Units 11/03/19  1407   TOTAL COUNTED  100           ED Treatment:   Medication Administration from 11/03/2019 1357 to 11/03/2019 1657    Date/Time Order Dose Route Action Comments   11/03/2019 1408 sodium chloride 0 9 % bolus 1,000 mL 1,000 mL Intravenous New Bag    11/03/2019 1408 naloxone (FOR EMS ONLY) (NARCAN) 2 MG/2ML injection 2 mg 0 mg Does not apply Given to EMS    11/03/2019 1408 ondansetron (FOR EMS ONLY) (ZOFRAN) 4 mg/2 mL injection 8 mg 0 mg Does not apply Given to EMS    11/03/2019 1408 LORazepam (FOR EMS ONLY) (ATIVAN) 2 mg/mL injection 2 mg 0 mg Does not apply Given to EMS    11/03/2019 1550 sodium chloride 0 9 % bolus 1,000 mL 1,000 mL Intravenous New Bag    11/03/2019 1544 cefepime (MAXIPIME) 2 g/50 mL dextrose IVPB 2,000 mg Intravenous New Bag    11/03/2019 1551 metroNIDAZOLE (FLAGYL) IVPB (premix) 500 mg 500 mg Intravenous New Bag    11/03/2019 1622 sodium chloride 0 9 % bolus 448 mL 448 mL Intravenous New Bag         Past Medical History:   Diagnosis Date    Heroin use      Present on Admission:   Accidental drug overdose   (Resolved) Encephalopathy   Elevated troponin   Elevated CK   Aspiration pneumonitis (HCC)   Acute respiratory failure with hypoxia and hypercapnia (HCC)   Lactic acidosis   Hyperglycemia   LETITIA (acute kidney injury) ([facility] [address] )   Severe sepsis (HCC)   Right wrist pain   Tobacco abuse      Admitting Diagnosis: Aspiration pneumonia ([facility] [address] ) [J69 0]  Heroin overdose ([facility] [address] ) [T40 1X1A]  Elevated troponin I level [R79 89]  H/O drug overdose [Z91 89]  Septic shock (HCC) [A41 9, R65 21]  Age/Sex: 54 y o  male  Admission Orders:  Scheduled Medications:    Medications:  cefTRIAXone 1,000 mg Intravenous Q24H   heparin (porcine) 5,000 Units Subcutaneous Q8H [address]   insulin lispro 1-5 Units Subcutaneous HS   insulin lispro 1-6 Units Subcutaneous TID AC   metroNIDAZOLE 500 mg Intravenous Q8H   nicotine 1 patch Transdermal Daily   sodium chloride 1,000 mL Intravenous Once     Continuous IV Infusions:     PRN Meds: not used     acetaminophen 650 mg Oral Q6H PRN     IP CONSULT TO PSYCHIATRY    Network Utilization Review Department  [email or web address]  ATTENTION: Please call with any questions or concerns to [phone] and carefully listen to the prompts so that you are directed to the right person  All voicemails are confidential   Amanda Cohen all requests for admission clinical reviews, approved or denied determinations and any other requests to dedicated fax number below belonging to the campus where the patient is receiving treatment    FACILITY NAME UR FAX NUMBER   ADMISSION DENIALS (Administrative/Medical Necessity) [phone]   PARENT CHILD HEALTH (Maternity/NICU/Pediatrics) [phone]   [facility] [address] [facility] [phone]   [address] [facility]  [phone]     Nixon Hernandez [phone]   [facility] [address] [facility] [address] [place] [address] [phone]

## 2019-11-05 VITALS
OXYGEN SATURATION: 95 % | HEART RATE: 102 BPM | BODY MASS INDEX: 26.64 KG/M2 | TEMPERATURE: 98.4 F | HEIGHT: 70 IN | DIASTOLIC BLOOD PRESSURE: 81 MMHG | RESPIRATION RATE: 18 BRPM | SYSTOLIC BLOOD PRESSURE: 134 MMHG | WEIGHT: 186.07 LBS

## 2019-11-05 PROBLEM — A41.9 SEVERE SEPSIS (HCC): Status: RESOLVED | Noted: 2019-11-03 | Resolved: 2019-11-05

## 2019-11-05 PROBLEM — J96.02 ACUTE RESPIRATORY FAILURE WITH HYPOXIA AND HYPERCAPNIA (HCC): Status: RESOLVED | Noted: 2019-11-03 | Resolved: 2019-11-05

## 2019-11-05 PROBLEM — R20.2 ARM PARESTHESIA, RIGHT: Status: ACTIVE | Noted: 2019-11-03

## 2019-11-05 PROBLEM — R77.8 ELEVATED TROPONIN: Status: RESOLVED | Noted: 2019-11-03 | Resolved: 2019-11-05

## 2019-11-05 PROBLEM — N17.9 AKI (ACUTE KIDNEY INJURY) (HCC): Status: RESOLVED | Noted: 2019-11-03 | Resolved: 2019-11-05

## 2019-11-05 PROBLEM — E87.20 LACTIC ACIDOSIS: Status: RESOLVED | Noted: 2019-11-03 | Resolved: 2019-11-05

## 2019-11-05 PROBLEM — R73.9 HYPERGLYCEMIA: Status: RESOLVED | Noted: 2019-11-03 | Resolved: 2019-11-05

## 2019-11-05 PROBLEM — J96.01 ACUTE RESPIRATORY FAILURE WITH HYPOXIA AND HYPERCAPNIA (HCC): Status: RESOLVED | Noted: 2019-11-03 | Resolved: 2019-11-05

## 2019-11-05 PROBLEM — E87.2 LACTIC ACIDOSIS: Status: RESOLVED | Noted: 2019-11-03 | Resolved: 2019-11-05

## 2019-11-05 PROBLEM — R65.20 SEVERE SEPSIS (HCC): Status: RESOLVED | Noted: 2019-11-03 | Resolved: 2019-11-05

## 2019-11-05 PROBLEM — R79.89 ELEVATED TROPONIN: Status: RESOLVED | Noted: 2019-11-03 | Resolved: 2019-11-05

## 2019-11-05 LAB
ANION GAP SERPL CALCULATED.3IONS-SCNC: 7 MMOL/L (ref 4–13)
BUN SERPL-MCNC: 14 MG/DL (ref 5–25)
CALCIUM SERPL-MCNC: 8.3 MG/DL (ref 8.3–10.1)
CHLORIDE SERPL-SCNC: 106 MMOL/L (ref 100–108)
CO2 SERPL-SCNC: 26 MMOL/L (ref 21–32)
CREAT SERPL-MCNC: 1.07 MG/DL (ref 0.6–1.3)
ERYTHROCYTE [DISTWIDTH] IN BLOOD BY AUTOMATED COUNT: 13.9 % (ref 11.6–15.1)
GFR SERPL CREATININE-BSD FRML MDRD: 82 ML/MIN/1.73SQ M
GLUCOSE SERPL-MCNC: 112 MG/DL (ref 65–140)
HCT VFR BLD AUTO: 40.7 % (ref 36.5–49.3)
HGB BLD-MCNC: 13.5 G/DL (ref 12–17)
MCH RBC QN AUTO: 30.4 PG (ref 26.8–34.3)
MCHC RBC AUTO-ENTMCNC: 33.2 G/DL (ref 31.4–37.4)
MCV RBC AUTO: 92 FL (ref 82–98)
PLATELET # BLD AUTO: 176 THOUSANDS/UL (ref 149–390)
PMV BLD AUTO: 10.7 FL (ref 8.9–12.7)
POTASSIUM SERPL-SCNC: 3.8 MMOL/L (ref 3.5–5.3)
RBC # BLD AUTO: 4.44 MILLION/UL (ref 3.88–5.62)
SODIUM SERPL-SCNC: 139 MMOL/L (ref 136–145)
WBC # BLD AUTO: 7.99 THOUSAND/UL (ref 4.31–10.16)

## 2019-11-05 PROCEDURE — 80048 BASIC METABOLIC PNL TOTAL CA: CPT | Performed by: INTERNAL MEDICINE

## 2019-11-05 PROCEDURE — 85027 COMPLETE CBC AUTOMATED: CPT | Performed by: INTERNAL MEDICINE

## 2019-11-05 PROCEDURE — NC001 PR NO CHARGE: Performed by: INTERNAL MEDICINE

## 2019-11-05 PROCEDURE — 99239 HOSP IP/OBS DSCHRG MGMT >30: CPT | Performed by: INTERNAL MEDICINE

## 2019-11-05 RX ORDER — NICOTINE 21 MG/24HR
1 PATCH, TRANSDERMAL 24 HOURS TRANSDERMAL DAILY
Qty: 28 PATCH | Refills: 0 | Status: SHIPPED | OUTPATIENT
Start: 2019-11-06

## 2019-11-05 RX ORDER — AMOXICILLIN AND CLAVULANATE POTASSIUM 875; 125 MG/1; MG/1
1 TABLET, FILM COATED ORAL EVERY 12 HOURS SCHEDULED
Status: DISCONTINUED | OUTPATIENT
Start: 2019-11-05 | End: 2019-11-05 | Stop reason: HOSPADM

## 2019-11-05 RX ORDER — AMOXICILLIN AND CLAVULANATE POTASSIUM 875; 125 MG/1; MG/1
1 TABLET, FILM COATED ORAL EVERY 12 HOURS SCHEDULED
Qty: 7 TABLET | Refills: 0 | Status: SHIPPED | OUTPATIENT
Start: 2019-11-05 | End: 2019-11-09

## 2019-11-05 RX ADMIN — AMOXICILLIN AND CLAVULANATE POTASSIUM 1 TABLET: 875; 125 TABLET, FILM COATED ORAL at 09:06

## 2019-11-05 RX ADMIN — ENOXAPARIN SODIUM 40 MG: 40 INJECTION SUBCUTANEOUS at 08:50

## 2019-11-05 RX ADMIN — NICOTINE 1 PATCH: 21 PATCH, EXTENDED RELEASE TRANSDERMAL at 08:47

## 2019-11-05 RX ADMIN — HEPARIN SODIUM 5000 UNITS: 5000 INJECTION INTRAVENOUS; SUBCUTANEOUS at 05:06

## 2019-11-05 RX ADMIN — CEFTRIAXONE 1000 MG: 1 INJECTION, SOLUTION INTRAVENOUS at 04:39

## 2019-11-05 NOTE — ASSESSMENT & PLAN NOTE
· Patient admits to snorting 1 bag of heroin  · Denies any suicidal ideation, seen and evaluated by Psychiatry diagnosed with Mood disorder, no need for inpatient admission, apparently expressed interest in outpatient vs inpatient programs / naloxone treatment  · Will further discuss with case management for provided resources  · Cessation education  · States occasional heroin use, no injection / reports snorts

## 2019-11-05 NOTE — ASSESSMENT & PLAN NOTE
· Suspect presenting hypoxic respiratory failure in setting of Opiate overdose with associated aspiration pneumonia vs pneumonitis  · CXR 11/4 without acute findings, hypoxia has resolved  · Currently on Ceftriaxone / Flagyl, would discontinue IV antibiotic therapy and complete course with Augmentin for 7 days  · Procalcitonin 0 85

## 2019-11-05 NOTE — DISCHARGE SUMMARY
Discharge- Mounika Saleh 1970, 54 y o  male MRN: [id number]    Unit/Bed#: -02 Encounter: [id number]    Primary Care Provider: No primary care provider on file  Date and time admitted to hospital: 11/3/2019  2:03 PM        Tobacco abuse  Assessment & Plan  · Nicotine patch  · Encourage cessation    Arm paresthesia, right  Assessment & Plan  · Noted right UE strength 3-4/5 without ability to completely open R hand, spread digits as well as hyperextend at wrist    · Sensation noted to be decreased from antecubital fossa distally to hand when compared to Left side   · Concern for potential brachial plexsus / nerve injury  · R wrist x-ray previously obtained and negative for acute pathology  · All compartments palpated and are soft, no concern for a compartment syndrome at this time  · MRI results- Unremarkable MRI of the right brachial plexus  · Patient's motor strength is improved compared to yesterday, leading me to believe that this is most likely a transient nerve injury caused by patient's passing out and maybe lying down on his right arm  · I would discharge patient today  · If he does not feel better within 1 week, he is to set up an appointment with a primary care physician and get an outpatient EMG done  Aspiration pneumonitis ([facility] [address] )  Assessment & Plan  · Suspect presenting hypoxic respiratory failure in setting of Opiate overdose with associated aspiration pneumonitis, as evidenced by tachycardia 113, hypothermia 95 3, leucocytosis 20K, lactic acid 5 2 on presentation  · Initial CXR 11/3 Slight volume loss on the left  Increased patchy density in the left lung field  Favor underlying developing atelectasis, infiltrate is not excluded  · Repeat CXR 11/4 - No acute cardiopulmonary disease  · Hypoxia also resolved  · Given rapid resolution of symptoms and labs, WBC 7 today, CXR clear, lactic acid 2   Picture is more consistent with aspiration pneumonitis  · Currently on Ceftriaxone / Flagyl, would discontinue IV antibiotic therapy and complete course with Augmentin for 7 days    Elevated CK  Assessment & Plan  · Previously 344, today 418  · Encourage regular oral hydration  · Can be discharged today    * Accidental drug overdose  Assessment & Plan  · Patient admits to snorting 1 bag of heroin  · Denies any suicidal ideation, seen and evaluated by Psychiatry diagnosed with Mood disorder, no need for inpatient admission, apparently expressed interest in outpatient vs inpatient programs / naloxone treatment  · Case management provided information on community resources including [employer]   · Cessation education  · States occasional heroin use, no injection / reports snorts           Discharging Physician / Practitioner: Jolene Green MD  PCP: No primary care provider on file  Admission Date:   Admission Orders (From admission, onward)     Ordered        11/03/19 1542  Inpatient Admission  Once                   Discharge Date: 11/05/19    Resolved Problems  Date Reviewed: 11/5/2019          Resolved    Encephalopathy 11/3/2019     Resolved by  GONZALEZ Canela    Elevated troponin 11/5/2019     Resolved by  Rosa Steele PA-C    Acute respiratory failure with hypoxia and hypercapnia ([facility] [address] ) 11/5/2019     Resolved by  Rosa Steele PA-C    Lactic acidosis 11/5/2019     Resolved by  Rosa Steele PA-C    Hyperglycemia 11/5/2019     Resolved by  Rosa Steele PA-C    LETITIA (acute kidney injury) ([facility] [address] ) 11/5/2019     Resolved by  Rosa Steele PA-C    Severe sepsis ([facility] [address] ) 11/5/2019     Resolved by  Rosa Steele PA-C          Consultations During Hospital Stay:  · Psychiatric    Procedures Performed:   · None    Significant Findings / Test Results:   · Chest x-ray (11/3)    FINDINGS:    Cardiomediastinal silhouette appears unremarkable      Slight volume loss on the left   Increased patchy density in the left lung field   Right lung is clear   No pneumothorax or pleural effusion  Osseous structures appear within normal limits for patient age  Impression:       Slight volume loss on the left   Increased patchy density in the left lung field    Favor underlying developing atelectasis, infiltrate is not excluded     · Chest x-ray (11/4)    FINDINGS:    There is no acute fracture or dislocation  Degenerative changes of the 1st carpal metacarpal [place]) articulation  No lytic or blastic lesions are seen  Soft tissues are unremarkable  Impression:       No acute osseous abnormality         Incidental Findings:   · None reported     Test Results Pending at Discharge (will require follow up): · None     Outpatient Tests Requested:  · None    Complications:  None    Reason for Admission:  Unresponsiveness    Hospital Course:     Soha Man is a 54 y o  male patient with history of heroin abuse who originally presented to the hospital on 11/3/2019 after he was found unresponsive in his car, by his father covered in vomit and unresponsive  He was brought to the emergency room via EMS and 0 4mg Narcan was given with minimal response  Patient woke up and became agitated with additional Narcan  In the ER was hypothermic 95 3, tachycardic, hypoxic and hypertensive  Labs showed WBC of 20, creatinine 1 52, lactic acid 5 2  CXR showed possible infiltrate and he was started on treatment for possible aspiration pneumonia/pneumonitis  Hypoxia resolved, LETITIA resolved, and patient rapidly improved  He did complain of right arm weakness and paresthesias, initial motor strength was 2/5 but has improved today 3 to 4/5  MRI showed no acute findings  Please see above list of diagnoses and related plan for additional information       Condition at Discharge: stable     Discharge Day Visit / Exam:     * Please refer to separate progress note for these details *    Discussion with Family:  Patient would like to update his dad on his medical care himself    Discharge instructions/Information to patient and family:   See after visit summary for information provided to patient and family  Provisions for Follow-Up Care:  See after visit summary for information related to follow-up care and any pertinent home health orders  Disposition:     Home    For Discharges to [facility] SNF:   · Not Applicable to this Patient - Not Applicable to this Patient    Planned Readmission:  No     Discharge Statement:  I spent 35 minutes discharging the patient  This time was spent on the day of discharge  I had direct contact with the patient on the day of discharge  Greater than 50% of the total time was spent examining patient, answering all patient questions, arranging and discussing plan of care with patient as well as directly providing post-discharge instructions  Additional time then spent on discharge activities  Discharge Medications:  See after visit summary for reconciled discharge medications provided to patient and family        ** Please Note: This note has been constructed using a voice recognition system **

## 2019-11-05 NOTE — PROGRESS NOTES
Progress Note - Alicja Cote 1970, 54 y o  male MRN: [id number]    Unit/Bed#: -02 Encounter: [id number]    Primary Care Provider: No primary care provider on file  Date and time admitted to hospital: 11/3/2019  2:03 PM        * Accidental drug overdose  Assessment & Plan  · Patient admits to snorting 1 bag of heroin  · Denies any suicidal ideation, seen and evaluated by Psychiatry diagnosed with Mood disorder, no need for inpatient admission, apparently expressed interest in outpatient vs inpatient programs / naloxone treatment  · Will further discuss with case management for provided resources  · Cessation education  · States occasional heroin use, no injection / reports snorts       Acute respiratory failure with hypoxia and hypercapnia (HCC)resolved as of 11/5/2019  Assessment & Plan  · Secondary to aspiration pneumonia versus pneumonitis  · Chest x-ray with left-sided haziness  · Continue ceftriaxone/Flagyl  · Transition from non-rebreather to high-flow nasal cannula for saturations greater than 92%  · Incentive spirometry/pulmonary hygiene  · Repeat chest x-ray done this a m   Shows improved aeration with decreased haziness on the L     Aspiration pneumonitis (HCC)  Assessment & Plan  · Suspect presenting hypoxic respiratory failure in setting of Opiate overdose with associated aspiration pneumonia vs pneumonitis  · CXR 11/4 without acute findings, hypoxia has resolved  · Currently on Ceftriaxone / Flagyl, would discontinue IV antibiotic therapy and complete course with Augmentin for 7 days  · Procalcitonin 0 85      Arm paresthesia, right  Assessment & Plan  · Noted right UE strength 4/5 without ability to completely open R hand, spread digits as well as hyperextend at wrist    · Sensation noted to be decreased from antecubital fossa distally to hand when compared to Left side   · Concern for potential brachial plexsus / nerve injury  · R wrist x-ray previously obtained and negative for acute pathology  · All compartments palpated and are soft, no concern for a compartment syndrome at this time  · MRI pending  · Consider obtaining Orthopedic consult as well as EMG with negative MRI    Elevated CK  Assessment & Plan  · Previously 344, today 418  · Encourage liquid PO intake  · Repeat in am    Tobacco abuse  Assessment & Plan  · Nicotine patch  · Encourage cessation        VTE Pharmacologic Prophylaxis:   Pharmacologic: Heparin  Mechanical VTE Prophylaxis in Place: Yes    Patient Centered Rounds: I have performed bedside rounds with nursing staff today  Discussions with Specialists or Other Care Team Provider: No consults at this time    Education and Discussions with Family / Patient: Discussed with patient this am    Time Spent for Care: 30 minutes  More than 50% of total time spent on counseling and coordination of care as described above  Current Length of Stay: 2 day(s)    Current Patient Status: Inpatient   Certification Statement: The patient will continue to require additional inpatient hospital stay due to R arm paresthesia / workup / elevated CK    Discharge Plan: Hopeful for d/c over next 24 hours    Code Status: Level 1 - Full Code      Subjective:   Patient seen and evaluated this am   Patient reports no new complaints  He reiterates the weakness about his right forearm as well as decreased sensation about such  Otherwise he denies any complaints, including chest pains, shortness of breath  Objective:     Vitals:   Temp (24hrs), Av 4 °F (36 9 °C), Min:97 9 °F (36 6 °C), Max:98 9 °F (37 2 °C)    Temp:  [97 9 °F (36 6 °C)-98 9 °F (37 2 °C)] 98 4 °F (36 9 °C)  HR:  [] 102  Resp:  [16-19] 18  BP: (108-134)/(64-81) 134/81  SpO2:  [94 %-98 %] 95 %  Body mass index is 26 7 kg/m²       Input and Output Summary (last 24 hours):     No intake or output data in the 24 hours ending 19 0815    Physical Exam:     Physical Exam  General: 55 y/o M in bed, awake, alert and oriented in no acute distress, currently eating breakfast  HEENT: Normocephalic, atraumatic  PERRL, EOMI, sclera anicteric, conjunctiva pink, oropharynx patent, mucous membranes moist, tolerating secretions  Neck: Supple, trachea midline  Heart: RRR without murmur, rub, or gallop  Lungs: clear and equal all fields bilaterally, no wheezing, rales, or rhonchi  Abd: Soft, non-tender, no guarding, rebound, or peritoneal signs, active BS noted  : voiding, no street  Skin: pink warm and dry  Neuro: GCS 15, conscious alert and oriented  Ext: decreased motor ability about RUE 4/5  Noted decreased sensation R antecubital fossa distally, with also noted flexed wrist with difficulty hyperextending  Compartments about the forearm / arm are not tense, soft with distal intact pulses    Additional Data:     Labs:    Results from last 7 days   Lab Units 11/05/19  0506 11/04/19  0546 11/03/19  1407   WBC Thousand/uL 7 99 11 17* 20 04*   HEMOGLOBIN g/dL 13 5 11 5* 13 3   HEMATOCRIT % 40 7 35 3* 42 5   PLATELETS Thousands/uL 176 146* 211   BANDS PCT %  --   --  3   NEUTROS PCT %  --  83*  --    LYMPHS PCT %  --  10*  --    LYMPHO PCT %  --   --  6*   MONOS PCT %  --  6  --    MONO PCT %  --   --  1*   EOS PCT %  --  1 0     Results from last 7 days   Lab Units 11/05/19  0506  11/03/19  1407   SODIUM mmol/L 139   < > 138   POTASSIUM mmol/L 3 8   < > 4 8   CHLORIDE mmol/L 106   < > 101   CO2 mmol/L 26   < > 23   CO2, I-STAT   --    < >  --    BUN mg/dL 14   < > 26*   CREATININE mg/dL 1 07   < > 1 52*   ANION GAP mmol/L 7   < > 14*   CALCIUM mg/dL 8 3   < > 8 5   ALBUMIN g/dL  --   --  3 8   TOTAL BILIRUBIN mg/dL  --   --  0 50   ALK PHOS U/L  --   --  72   ALT U/L  --   --  32   AST U/L  --   --  29   GLUCOSE RANDOM mg/dL 112   < > 220*    < > = values in this interval not displayed       Results from last 7 days   Lab Units 11/03/19  1431   INR  1 01     Results from last 7 days   Lab Units 11/04/19  1218 11/04/19  0800 11/03/19  2130 11/03/19  1705   POC GLUCOSE mg/dl 137 99 91 61*     Results from last 7 days   Lab Units 11/03/19  1407   HEMOGLOBIN A1C % 6 3     Results from last 7 days   Lab Units 11/04/19  0546 11/03/19  1816 11/03/19  1620 11/03/19  1431   LACTIC ACID mmol/L  --   --  2 0 5 2*   PROCALCITONIN ng/ml 0 85* 0 60*  --   --            * I Have Reviewed All Lab Data Listed Above  * Additional Pertinent Lab Tests Reviewed: Mar[facility] Admission Reviewed    Imaging:    Imaging Reports Reviewed Today Include: CXR 11/4 reviewed also Wrist Xray reviewed  Imaging Personally Reviewed by Myself Includes:  CXR / Wrist Xray    Recent Cultures (last 7 days):     Results from last 7 days   Lab Units 11/03/19  1431   BLOOD CULTURE  No Growth at 24 hrs  No Growth at 24 hrs  Last 24 Hours Medication List:     Current Facility-Administered Medications:  acetaminophen 650 mg Oral Q6H PRN Benedictfida Primrose, CRNP    cefTRIAXone 1,000 mg Intravenous Q24H Elfida Primrose, CRNP Last Rate: 1,000 mg (11/05/19 0439)   heparin (porcine) 5,000 Units Subcutaneous Q8H [facility] GONZALEZ Chance    hydrOXYzine HCL 25 mg Oral Q6H PRN Latha Maldonado PA-C    hydrOXYzine HCL 50 mg Oral Q6H PRN Latha Maldonado PA-C    metroNIDAZOLE 500 mg Intravenous Q8H GONZALEZ Chance Last Rate: 500 mg (11/04/19 2301)   nicotine 1 patch Transdermal Daily Benedictfida Primrose, CRNP         Today, Patient Was Seen By: Rina De La Cruz PA-C    ** Please Note: Dictation voice to text software may have been used in the creation of this document   **

## 2019-11-05 NOTE — ASSESSMENT & PLAN NOTE
· Suspect presenting hypoxic respiratory failure in setting of Opiate overdose with associated aspiration pneumonitis, as evidenced by tachycardia 113, hypothermia 95 3, leucocytosis 20K, lactic acid 5 2 on presentation  · Initial CXR 11/3 Slight volume loss on the left  Increased patchy density in the left lung field  Favor underlying developing atelectasis, infiltrate is not excluded  · Repeat CXR 11/4 - No acute cardiopulmonary disease  · Hypoxia also resolved  · Given rapid resolution of symptoms and labs, WBC 7 today, CXR clear, lactic acid 2   Picture is more consistent with aspiration pneumonitis  · Currently on Ceftriaxone / Flagyl, would discontinue IV antibiotic therapy and complete course with Augmentin for 7 days

## 2019-11-05 NOTE — ASSESSMENT & PLAN NOTE
· Noted right UE strength 3-4/5 without ability to completely open R hand, spread digits as well as hyperextend at wrist    · Sensation noted to be decreased from antecubital fossa distally to hand when compared to Left side   · Concern for potential brachial plexsus / nerve injury  · R wrist x-ray previously obtained and negative for acute pathology  · All compartments palpated and are soft, no concern for a compartment syndrome at this time  · MRI results- Unremarkable MRI of the right brachial plexus  · Patient's motor strength is improved compared to yesterday, leading me to believe that this is most likely a transient nerve injury caused by patient's passing out and maybe lying down on his right arm  · I would discharge patient today  · If he does not feel better within 1 week, he is to set up an appointment with a primary care physician and get an outpatient EMG done

## 2019-11-05 NOTE — DISCHARGE INSTR - AVS FIRST PAGE
Dear Lyn Ballard,     It was our pleasure to care for you here at [facility]   It is our hope that we were always able to meet and exceed the expected standards for your care during your stay  You were hospitalized after you found unresponsive with vomit all over you, most likely secondary to heroin abuse  On presentation you had signs of inflamed lungs and you were started on antibiotics and oxygen  You improved and you are now stable for discharge  You were cared for initially in the step-down unit and afterwards on the general medical floor under the service of Celina Lopez MD with the [facility] who covers for your primary care physician (PCP), No primary care provider on file  , while you were hospitalized  If you have any questions or concerns related to this hospitalization, you may contact us at [phone]  For follow up, we recommend that you follow up with your primary care physician  Please review this entire discharge summary as additional general instructions may be provided later as well  However, at this time we provide for you here, the most important instructions / recommendations at discharge:     · Make an appointment within 1 week with a primary care provider     · You have been provided paperwork that shows available physician practices in your community  · Follow-up with [employer] to help you with the right resources to quit heroin      Sincerely,     Celina Lopez MD

## 2019-11-05 NOTE — ASSESSMENT & PLAN NOTE
· Noted right UE strength 4/5 without ability to completely open R hand, spread digits as well as hyperextend at wrist    · Sensation noted to be decreased from antecubital fossa distally to hand when compared to Left side   · Concern for potential brachial plexsus / nerve injury  · R wrist x-ray previously obtained and negative for acute pathology  · All compartments palpated and are soft, no concern for a compartment syndrome at this time  · MRI pending  · Consider obtaining Orthopedic consult as well as EMG with negative MRI

## 2019-11-05 NOTE — ASSESSMENT & PLAN NOTE
· Patient admits to snorting 1 bag of heroin  · Denies any suicidal ideation, seen and evaluated by Psychiatry diagnosed with Mood disorder, no need for inpatient admission, apparently expressed interest in outpatient vs inpatient programs / naloxone treatment  · Case management provided information on community resources including [facility]   · Cessation education  · States occasional heroin use, no injection / reports snorts

## 2019-11-05 NOTE — SOCIAL WORK
Continue to follow   informed that Pt will need Lyft ride home  Met with Pt   informed that Lyft ride can be arranged for Pt  Call placed to [facility], spoke with Vicente Lucero ridrohit arranged to Pt's home residence at [place] [id number], [address]  Pickup is 11:30am  Pt informed of transport time  Pt signed Lyft Waiver  Lyft Waiver placed in media bin

## 2019-11-05 NOTE — DISCHARGE INSTRUCTIONS
Adult Overdose   WHAT YOU NEED TO KNOW:   An overdose occurs when you take more medicine than is safe to take  An overdose may be mild, or it may be a life-threatening emergency  You may feel drowsy, dizzy, or nauseated, depending on what medicine you took  No specific harm was found to your body as a result of your overdose  Your symptoms have decreased over the last 6 to 12 hours  DISCHARGE INSTRUCTIONS:   Call 911 if you or someone close to you has any of the following symptoms:   · Your face is very pale and clammy to the touch  · Your body is limp or you are unable to speak  · You cannot be awakened  · Your breathing is slower or faster than usual      · Your heart is beating slower than usual     · You feel confused or more tired than usual, or you are sweating more than normal     · Your speech is slurred  · Your fingernails or lips are blue or purple  Return to the emergency department if:   · You have severe nausea and vomiting  · You cannot have a bowel movement or urinate  · Your skin and the whites of your eyes turn yellow  Contact your healthcare provider if:   · You think your medicine is not working  · You have nausea, vomiting, diarrhea, or abdominal cramps  · You have questions or concerns about your medicine  Take your medicine as directed:  Contact your healthcare provider if you think your medicine is not helping or if you have side effects  Do not take more medicine that is prescribed  Keep your medicines in the original containers  Keep a list of the medicines, vitamins, and herbs you take  Include the amounts, and when and why you take them  Do not share your medicine with others  Prevent another overdose:   · Read labels carefully  Read the labels of all the medicines that you take  Never take more than the label says to take  If you have questions, ask your pharmacist or healthcare provider  · Do not drink alcohol    Alcohol increases your risk for another overdose  Alcohol can also hide important symptoms that you need to call your healthcare provider for  · Do not drive or operate machinery  until your healthcare provider says it is okay  These activities may be dangerous after an overdose  · Use caution if you take more than one medicine at a time  Mixing medicines or taking more than one medicine at a time can be dangerous  · Tell your family or friends what medicines you are taking  Talk with them about what to do if you have an overdose  Follow up with your healthcare provider as directed: You may need to see a counselor or psychiatrist  Write down your questions so you remember to ask them during your visits  © 2017 [employer]  Information is for End User's use only and may not be sold, redistributed or otherwise used for commercial purposes  All illustrations and images included in CareNotes® are the copyrighted property of A D A M , Inc  or Bharathi Huang  The above information is an  only  It is not intended as medical advice for individual conditions or treatments  Talk to your doctor, nurse or pharmacist before following any medical regimen to see if it is safe and effective for you  Aspiration Pneumonia   WHAT YOU NEED TO KNOW:   Aspiration pneumonia is a lung infection that develops after you aspirate (inhale) food, liquid, or vomit into your lungs  You can also aspirate food or liquid from your stomach that backs up into your esophagus  If you are not able to cough up the aspirated material, bacteria can grow in your lungs and cause an infection  Your risk is highest if you are older than 75 or live in a nursing home or long-term care center  You may be less active, bedridden, or not able to swallow or cough well  The muscles that help you swallow can become weakened by age, illness, or disease  Your risk also increases if you have a weak immune system          DISCHARGE INSTRUCTIONS: Seek care immediately if:   · You have chest pain  · You are confused or cannot think clearly  · You have more trouble breathing or your breathing seems faster than normal   Contact your healthcare provider if:   · You have a fever  · Your symptoms are not better after 2 or 3 days of treatment  · You have questions or concerns about your condition or care  Medicines: You may need any of the following:  · Antibiotics  treat pneumonia caused by bacteria  · Steroids  may help to open your air passages so you can breathe easier  Do not stop taking this medicine without asking your healthcare provider  Stopping on your own can cause problems  · Take your medicine as directed  Contact your healthcare provider if you think your medicine is not helping or if you have side effects  Tell him or her if you are allergic to any medicine  Keep a list of the medicines, vitamins, and herbs you take  Include the amounts, and when and why you take them  Bring the list or the pill bottles to follow-up visits  Carry your medicine list with you in case of an emergency  Follow up with your healthcare provider as directed: You may need another chest x-ray in 6 to 8 weeks  Follow up with your speech-language pathologist, dietitian, or occupational therapist as directed  Write down your questions so you remember to ask them during your follow-up visits  Prevent or manage aspiration pneumonia:   · Go to speech therapy as directed  A speech therapist can teach you exercises to strengthen the muscles you use to swallow  · Sit up while you eat  If you are bedridden, keep the head of your bed slightly up (at about a 30° to 45° angle) while you eat  Take small bites, eat slowly, and swallow with your chin down  · Eat soft foods and drink thickened liquids  A dietitian can teach you how to thicken your liquids so you have less trouble swallowing  Drink liquids through a straw or sip them from a spoon   Ask your dietitian what kinds of foods you should eat  He or she may suggest soft foods such as cooked cereal, pasta, well-cooked fruits and vegetables, and scrambled eggs  Your dietitian may also suggest moist, tender meats that are cut into small pieces  · Care for your teeth and mouth  Mouth care can help kill harmful bacteria in your mouth so you do not aspirate them  While you are sitting up, brush your teeth for 2 minutes daily after breakfast and again after dinner  Also brush your tongue  If you do not have teeth, gently brush your gums with a soft toothbrush  Dentures should be removed and cleaned with an electric toothbrush and water after breakfast and dinner  Soak dentures overnight in a cleaning solution  Visit a dentist regularly to have your teeth and gums cleaned  · Limit or avoid taking sedatives  These medicines increase the risk of aspiration because they dry out your mouth and make you drowsy  If possible, avoid taking antihistamine medicines because they also make your mouth dry  · Do not smoke  Nicotine and other chemicals in cigarettes and cigars can cause lung damage  Ask your healthcare provider for information if you currently smoke and need help to quit  E-cigarettes or smokeless tobacco still contain nicotine  Talk to your healthcare provider before you use these products  © 2017 [employer] Information is for End User's use only and may not be sold, redistributed or otherwise used for commercial purposes  All illustrations and images included in CareNotes® are the copyrighted property of [employer]  or Bharathi Huang  The above information is an  only  It is not intended as medical advice for individual conditions or treatments  Talk to your doctor, nurse or pharmacist before following any medical regimen to see if it is safe and effective for you

## 2019-11-08 LAB
BACTERIA BLD CULT: NORMAL
BACTERIA BLD CULT: NORMAL

## 2020-09-08 ENCOUNTER — APPOINTMENT (OUTPATIENT)
Dept: RADIOLOGY | Facility: CLINIC | Age: 50
End: 2020-09-08
Payer: OTHER MISCELLANEOUS

## 2020-09-08 ENCOUNTER — OCCMED (OUTPATIENT)
Dept: URGENT CARE | Facility: CLINIC | Age: 50
End: 2020-09-08
Payer: OTHER MISCELLANEOUS

## 2020-09-08 DIAGNOSIS — S39.012A STRAIN OF LUMBAR REGION, INITIAL ENCOUNTER: ICD-10-CM

## 2020-09-08 DIAGNOSIS — S39.012A STRAIN OF LUMBAR REGION, INITIAL ENCOUNTER: Primary | ICD-10-CM

## 2020-09-08 PROCEDURE — G0382 LEV 3 HOSP TYPE B ED VISIT: HCPCS | Performed by: PHYSICIAN ASSISTANT

## 2020-09-08 PROCEDURE — 99283 EMERGENCY DEPT VISIT LOW MDM: CPT | Performed by: PHYSICIAN ASSISTANT

## 2020-09-08 PROCEDURE — 72100 X-RAY EXAM L-S SPINE 2/3 VWS: CPT

## 2021-02-20 ENCOUNTER — OFFICE VISIT (OUTPATIENT)
Dept: URGENT CARE | Facility: CLINIC | Age: 51
End: 2021-02-20
Payer: COMMERCIAL

## 2021-02-20 VITALS
RESPIRATION RATE: 16 BRPM | TEMPERATURE: 96.7 F | HEIGHT: 69 IN | WEIGHT: 190.6 LBS | DIASTOLIC BLOOD PRESSURE: 96 MMHG | HEART RATE: 83 BPM | SYSTOLIC BLOOD PRESSURE: 143 MMHG | OXYGEN SATURATION: 97 % | BODY MASS INDEX: 28.23 KG/M2

## 2021-02-20 DIAGNOSIS — K04.7 INFECTED DENTAL CARRIES: Primary | ICD-10-CM

## 2021-02-20 DIAGNOSIS — K02.9 INFECTED DENTAL CARRIES: Primary | ICD-10-CM

## 2021-02-20 PROCEDURE — 99213 OFFICE O/P EST LOW 20 MIN: CPT | Performed by: PHYSICIAN ASSISTANT

## 2021-02-20 RX ORDER — CHLORHEXIDINE GLUCONATE 0.12 MG/ML
15 RINSE ORAL 2 TIMES DAILY
Qty: 120 ML | Refills: 0 | Status: SHIPPED | OUTPATIENT
Start: 2021-02-20

## 2021-02-20 RX ORDER — PENICILLIN V POTASSIUM 500 MG/1
500 TABLET ORAL EVERY 6 HOURS SCHEDULED
Qty: 28 TABLET | Refills: 0 | Status: SHIPPED | OUTPATIENT
Start: 2021-02-20 | End: 2021-02-27

## 2021-02-20 RX ORDER — PENICILLIN V POTASSIUM 500 MG/1
500 TABLET ORAL EVERY 6 HOURS SCHEDULED
Qty: 28 TABLET | Refills: 0 | Status: SHIPPED | OUTPATIENT
Start: 2021-02-20 | End: 2021-02-20 | Stop reason: SDUPTHER

## 2021-02-20 RX ORDER — CHLORHEXIDINE GLUCONATE 0.12 MG/ML
15 RINSE ORAL 2 TIMES DAILY
Qty: 120 ML | Refills: 0 | Status: SHIPPED | OUTPATIENT
Start: 2021-02-20 | End: 2021-02-20 | Stop reason: SDUPTHER

## 2021-02-20 NOTE — PROGRESS NOTES
[facility]        NAME: Darrell Villalobos is a 52 y o  male  : 1970    MRN: [id number]  DATE: 2021  TIME: 9:19 AM    Assessment and Plan   Infected dental carries [K02 9, K04 7]  1  Infected dental carries  penicillin V potassium (VEETID) 500 mg tablet    chlorhexidine (PERIDEX) 0 12 % solution     Pt has dental follow up  Instructed to return or go to ER for worsening symptoms  Patient Instructions   Patient Instructions   Dental Caries   WHAT YOU NEED TO KNOW:   Dental caries are also called cavities  Cavities are caused by bacteria  When the bacteria in tooth plaque (sticky film) mix with certain types of carbohydrate, this creates acid  The acid breaks down areas of enamel, which covers the outside of a tooth  This creates a small hole in the tooth called a cavity  DISCHARGE INSTRUCTIONS:   Return to the emergency department if:   · Your face, jaw, cheek, eye, or neck begin to swell  Contact your dentist if:   · You have a fever  · Your tooth pain gets worse  · You have questions or concerns about your condition or care  Follow up with your dentist as directed:  Write down your questions so you remember to ask them during your visits  Prevent dental caries:   · Brush your teeth at least 2 times a day with fluoride toothpaste  · Use dental floss to clean between your teeth at least once a day  · Rinse your mouth with water or mouthwash after meals and snacks  · Chew sugarless gum after meals and snacks  · See your dentist regularly for dental cleanings and oral exams  [address] is for End User's use only and may not be sold, redistributed or otherwise used for commercial purposes  All illustrations and images included in CareNotes® are the copyrighted property of A D [employer] , [employer]  or Bharathi Huang  The above information is an  only   It is not intended as medical advice for individual conditions or treatments  Talk to your doctor, nurse or pharmacist before following any medical regimen to see if it is safe and effective for you  Proceed to  ER if symptoms worsen  Chief Complaint     Chief Complaint   Patient presents with    Dental Pain     L Lower tooth pain starting 02/17/20 02/20/21 swelling L facial, pain 8/10 that readiates to L ear and jaw  Pt states dentist appointment 02/25/21  Pt taking Advil otc, c/o abdominal discomfort d/t NSAID usage, denies ulcers  History of Present Illness       Pt presents for evaluation of left lower dental pain x 4 days and now with some localized facial swelling  He reports taking NSAIDS but they are causing him some stomach upset  Pt denies fever  He states he has a dental appointment on 2/25/21  Review of Systems   Review of Systems   Constitutional: Negative for fever  HENT: Positive for dental problem            Current Medications       Current Outpatient Medications:     chlorhexidine (PERIDEX) 0 12 % solution, Apply 15 mL to the mouth or throat 2 (two) times a day Swish, gargle and spit, Disp: 120 mL, Rfl: 0    nicotine (NICODERM CQ) 21 mg/24 hr TD 24 hr patch, Place 1 patch on the skin daily (Patient not taking: Reported on 2/20/2021), Disp: 28 patch, Rfl: 0    penicillin V potassium (VEETID) 500 mg tablet, Take 1 tablet (500 mg total) by mouth every 6 (six) hours for 7 days, Disp: 28 tablet, Rfl: 0    Current Allergies     Allergies as of 02/20/2021    (No Known Allergies)            The following portions of the patient's history were reviewed and updated as appropriate: allergies, current medications, past family history, past medical history, past social history, past surgical history and problem list      Past Medical History:   Diagnosis Date    Heroin use     Osteoarthritis     bilateral knees       Past Surgical History:   Procedure Laterality Date    TONSILLECTOMY      WISDOM TOOTH EXTRACTION Family History   Problem Relation Age of Onset    Diabetes Father          Medications have been verified  Objective   /96   Pulse 83   Temp (!) 96 7 °F (35 9 °C)   Resp 16   Ht 5' 9" (1 753 m)   Wt 86 5 kg (190 lb 9 6 oz)   SpO2 97%   BMI 28 15 kg/m²        Physical Exam     Physical Exam  Vitals signs reviewed  Constitutional:       General: He is not in acute distress  HENT:      Right Ear: Tympanic membrane normal       Left Ear: Tympanic membrane normal       Mouth/Throat:      Mouth: Mucous membranes are moist       Dentition: Abnormal dentition  Dental tenderness and dental caries present  No dental abscesses  Pharynx: Oropharynx is clear  Comments: No visible abscess at this time but all teeth are in poor repair  BL molars are absent  Pain is central to the left lower canine  Lymphadenopathy:      Head:      Right side of head: No submental, submandibular or tonsillar adenopathy  Left side of head: No submental, submandibular or tonsillar adenopathy  Cervical: No cervical adenopathy  Skin:     General: Skin is warm and dry  Neurological:      Mental Status: He is alert

## 2021-02-20 NOTE — PATIENT INSTRUCTIONS
Dental Caries   WHAT YOU NEED TO KNOW:   Dental caries are also called cavities  Cavities are caused by bacteria  When the bacteria in tooth plaque (sticky film) mix with certain types of carbohydrate, this creates acid  The acid breaks down areas of enamel, which covers the outside of a tooth  This creates a small hole in the tooth called a cavity  DISCHARGE INSTRUCTIONS:   Return to the emergency department if:   · Your face, jaw, cheek, eye, or neck begin to swell  Contact your dentist if:   · You have a fever  · Your tooth pain gets worse  · You have questions or concerns about your condition or care  Follow up with your dentist as directed:  Write down your questions so you remember to ask them during your visits  Prevent dental caries:   · Brush your teeth at least 2 times a day with fluoride toothpaste  · Use dental floss to clean between your teeth at least once a day  · Rinse your mouth with water or mouthwash after meals and snacks  · Chew sugarless gum after meals and snacks  · See your dentist regularly for dental cleanings and oral exams  © 2017 [address] is for End User's use only and may not be sold, redistributed or otherwise used for commercial purposes  All illustrations and images included in CareNotes® are the copyrighted property of A D A M , Inc  or Bharathi Huang  The above information is an  only  It is not intended as medical advice for individual conditions or treatments  Talk to your doctor, nurse or pharmacist before following any medical regimen to see if it is safe and effective for you

## 2021-05-14 NOTE — SOCIAL WORK
LOS: 0  Pt is not documented bundle  Pt is not a 30 day readmission  Received case management re: drug/alcohol  Met with Pt  Discussed role of case management and discharge planning, identifying help at home and discharge preference  Pt reports he lives with his father in [place], no steps to enter  Pt reports he is independent with adls and ambulation  No DME  Pt reports he drives and goes grocery shopping  Pt reports he will be using [facility] in [place]  Pt reports he does not have healthcare insurance   informed Pt that referral will be sent to [facility] for medical assistance application  Pt reports he does not have POA or living will and declines information for POA and living will  Pt denies hx of VNA  Pt reports he rarely uses heroin and is agreeable for information for outpt resources  Pt choosing information for [facility]  Information for [facility] given to Pt  Referral sent to [facility]  Will follow  No

## 2021-05-23 ENCOUNTER — APPOINTMENT (EMERGENCY)
Dept: RADIOLOGY | Facility: HOSPITAL | Age: 51
End: 2021-05-23
Payer: COMMERCIAL

## 2021-05-23 ENCOUNTER — HOSPITAL ENCOUNTER (EMERGENCY)
Facility: HOSPITAL | Age: 51
Discharge: HOME | End: 2021-05-23
Attending: EMERGENCY MEDICINE | Admitting: EMERGENCY MEDICINE
Payer: COMMERCIAL

## 2021-05-23 VITALS
HEART RATE: 81 BPM | BODY MASS INDEX: 28.14 KG/M2 | WEIGHT: 190 LBS | OXYGEN SATURATION: 98 % | HEIGHT: 69 IN | DIASTOLIC BLOOD PRESSURE: 86 MMHG | SYSTOLIC BLOOD PRESSURE: 137 MMHG | TEMPERATURE: 98.9 F | RESPIRATION RATE: 20 BRPM

## 2021-05-23 DIAGNOSIS — M23.92 INTERNAL DERANGEMENT OF LEFT KNEE: Primary | ICD-10-CM

## 2021-05-23 PROCEDURE — 99284 EMERGENCY DEPT VISIT MOD MDM: CPT | Performed by: EMERGENCY MEDICINE

## 2021-05-23 PROCEDURE — 99283 EMERGENCY DEPT VISIT LOW MDM: CPT

## 2021-05-23 PROCEDURE — 73562 X-RAY EXAM OF KNEE 3: CPT

## 2021-05-23 RX ORDER — ACETAMINOPHEN 325 MG/1
650 TABLET ORAL ONCE
Status: COMPLETED | OUTPATIENT
Start: 2021-05-23 | End: 2021-05-23

## 2021-05-23 RX ORDER — IBUPROFEN 600 MG/1
600 TABLET ORAL ONCE
Status: COMPLETED | OUTPATIENT
Start: 2021-05-23 | End: 2021-05-23

## 2021-05-23 RX ADMIN — IBUPROFEN 600 MG: 600 TABLET, FILM COATED ORAL at 08:57

## 2021-05-23 RX ADMIN — ACETAMINOPHEN 650 MG: 325 TABLET, FILM COATED ORAL at 08:57

## 2021-05-23 NOTE — ED PROVIDER NOTES
History  Chief Complaint   Patient presents with    Knee Pain     patient presents to the ED with c/o left knee pain, states it popped out in his sleep last evening  states hx of this happening frequently      57-year-old male presents for evaluation of nontraumatic left knee pain  The patient states this recurrent issue that is been ongoing for several months to a year  He states that is been getting worse recently  He states that he feels like it pops out of place and back into place however this is been occurring with increasing frequency and longer duration of time where does not go back into place  The patient has increasing pain which is worse with any range of motion  Patient has been taking Aleve without improvement  He states this episode occurred approximately 2 in the morning when he was sleeping any feels like he rolled and had the knee pop  He denies any numbness or tingling  He denies any pain in the hip or ankle  Prior to Admission Medications   Prescriptions Last Dose Informant Patient Reported? Taking?   chlorhexidine (PERIDEX) 0 12 % solution Not Taking at Unknown time  No No   Sig: Apply 15 mL to the mouth or throat 2 (two) times a day Swish, gargle and spit   Patient not taking: Reported on 5/23/2021   nicotine (NICODERM CQ) 21 mg/24 hr TD 24 hr patch   No No   Sig: Place 1 patch on the skin daily   Patient not taking: Reported on 2/20/2021      Facility-Administered Medications: None       Past Medical History:   Diagnosis Date    Heroin use     Osteoarthritis     bilateral knees       Past Surgical History:   Procedure Laterality Date    TONSILLECTOMY      WISDOM TOOTH EXTRACTION         Family History   Problem Relation Age of Onset    Diabetes Father      I have reviewed and agree with the history as documented      E-Cigarette/Vaping    E-Cigarette Use Never User      E-Cigarette/Vaping Substances     Social History     Tobacco Use    Smoking status: Current Every Day Smoker     Packs/day: 1 00     Years: 30 00     Pack years: 30 00    Smokeless tobacco: Never Used   Substance Use Topics    Alcohol use: Yes     Frequency: Monthly or less     Comment: "maybe twice a year"    Drug use: Not Currently     Types: Marijuana, Heroin       Review of Systems   Constitutional: Negative for chills and fever  Musculoskeletal: Positive for arthralgias  Negative for joint swelling  Neurological: Negative for numbness  All other systems reviewed and are negative  Physical Exam  Physical Exam  Vitals signs and nursing note reviewed  Constitutional:       General: He is not in acute distress  Appearance: He is well-developed  HENT:      Head: Normocephalic and atraumatic  Right Ear: External ear normal       Left Ear: External ear normal    Eyes:      General: No scleral icterus  Neck:      Musculoskeletal: Normal range of motion  Pulmonary:      Effort: Pulmonary effort is normal  No respiratory distress  Breath sounds: Normal breath sounds  Abdominal:      General: There is no distension  Musculoskeletal:      Left knee: He exhibits decreased range of motion  He exhibits no swelling, no effusion, no LCL laxity, normal patellar mobility and no MCL laxity  Skin:     Capillary Refill: Capillary refill takes less than 2 seconds  Findings: No rash  Neurological:      Mental Status: He is alert           Vital Signs  ED Triage Vitals   Temperature Pulse Respirations Blood Pressure SpO2   05/23/21 0843 05/23/21 0842 05/23/21 0842 05/23/21 0842 05/23/21 0842   98 9 °F (37 2 °C) 81 20 137/86 98 %      Temp Source Heart Rate Source Patient Position - Orthostatic VS BP Location FiO2 (%)   05/23/21 0843 -- -- -- --   Temporal          Pain Score       05/23/21 0842       5           Vitals:    05/23/21 0842   BP: 137/86   Pulse: 81         Visual Acuity      ED Medications  Medications   ibuprofen (MOTRIN) tablet 600 mg (600 mg Oral Given 5/23/21 0857) acetaminophen (TYLENOL) tablet 650 mg (650 mg Oral Given 5/23/21 0857)       Diagnostic Studies  Results Reviewed     None                 XR knee 3 views left non injury   ED Interpretation by Brenda Lombardi DO (05/23 1721)   Lateral offset of the tibial plateau  No fracture noted  Final Result by Marily Mello MD (05/23 6969)      Mild osteoarthritis  Apparent femur medial subluxation  No acute osseous abnormality  Workstation performed: [id number]                    Procedures  Procedures         ED Course  ED Course as of May 23 1544   Sun May 23, 2021   5012 Discussed the x-ray with Dr Ami Silva from Radiology                                SBIRT 22yo+      Most Recent Value   SBIRT (25 yo +)   In order to provide better care to our patients, we are screening all of our patients for alcohol and drug use  Would it be okay to ask you these screening questions? Yes Filed at: 05/23/2021 0847   Initial Alcohol Screen: US AUDIT-C    1  How often do you have a drink containing alcohol?  0 Filed at: 05/23/2021 0847   2  How many drinks containing alcohol do you have on a typical day you are drinking? 0 Filed at: 05/23/2021 0847   3a  Male UNDER 65: How often do you have five or more drinks on one occasion? 0 Filed at: 05/23/2021 0847   3b  FEMALE Any Age, or MALE 65+: How often do you have 4 or more drinks on one occassion? 0 Filed at: 05/23/2021 0847   Audit-C Score  0 Filed at: 05/23/2021 2559   SPENCER: How many times in the past year have you    Used an illegal drug or used a prescription medication for non-medical reasons?   Never Filed at: 05/23/2021 0847                    MDM  Number of Diagnoses or Management Options  Internal derangement of left knee:   Diagnosis management comments: Differential diagnosis:  Meniscus injury, ligamentous injury, dislocation, occult fracture  Plan for x-rays to rule out fracture/dislocation then knee immobilizer, pain control, crutches and outpatient orthopedic follow-up       Amount and/or Complexity of Data Reviewed  Tests in the radiology section of CPT®: reviewed  Discuss the patient with other providers: yes (Radiology)  Independent visualization of images, tracings, or specimens: yes        Disposition  Final diagnoses:   Internal derangement of left knee     Time reflects when diagnosis was documented in both MDM as applicable and the Disposition within this note     Time User Action Codes Description Comment    5/23/2021  9:34 AM Isma Tinsley Add [M23 92] Internal derangement of left knee       ED Disposition     ED Disposition Condition Date/Time Comment    Discharge Stable Sun May 23, 2021  9:34 AM Riddhi Sessions discharge to home/self care  Follow-up Information     Follow up With Specialties Details Why Contact Info Additional Information    [address] [facility] Call in 1 day For further evaluation [phone] [id number] [phone] [id number]  [address] [facility], [address] [place] [address] 310          Discharge Medication List as of 5/23/2021  9:36 AM      CONTINUE these medications which have NOT CHANGED    Details   chlorhexidine (PERIDEX) 0 12 % solution Apply 15 mL to the mouth or throat 2 (two) times a day Swish, gargle and spit, Starting Sat 2/20/2021, Normal      nicotine (NICODERM CQ) 21 mg/24 hr TD 24 hr patch Place 1 patch on the skin daily, Starting Wed 11/6/2019, Print           No discharge procedures on file      PDMP Review       Value Time User    PDMP Reviewed  Yes 11/5/2019  9:16 AM Makayla Rebollar MD          ED Provider  Electronically Signed by           Gómez Jaquez DO  05/23/21 5236

## 2021-05-23 NOTE — DISCHARGE INSTRUCTIONS
Take over-the-counter ibuprofen for pain    Return to the emergency department for:  Severe uncontrolled pain  Discoloration of the leg  New numbness or tingling  New and concerning symptoms

## 2021-05-23 NOTE — Clinical Note
Evan Osorio was seen and treated in our emergency department on 5/23/2021  No work until cleared by Family Doctor/Orthopedics        Diagnosis:     Dustin Elliott    He may return on this date: If you have any questions or concerns, please don't hesitate to call        Radha Stout DO    ______________________________           _______________          _______________  Hospital Representative                              Date                                Time

## 2021-05-28 ENCOUNTER — OFFICE VISIT (OUTPATIENT)
Dept: OBGYN CLINIC | Facility: CLINIC | Age: 51
End: 2021-05-28
Payer: COMMERCIAL

## 2021-05-28 ENCOUNTER — APPOINTMENT (OUTPATIENT)
Dept: RADIOLOGY | Facility: CLINIC | Age: 51
End: 2021-05-28
Payer: COMMERCIAL

## 2021-05-28 VITALS
SYSTOLIC BLOOD PRESSURE: 120 MMHG | HEIGHT: 69 IN | TEMPERATURE: 98.4 F | BODY MASS INDEX: 27.4 KG/M2 | DIASTOLIC BLOOD PRESSURE: 70 MMHG | WEIGHT: 185 LBS

## 2021-05-28 DIAGNOSIS — M25.562 LEFT KNEE PAIN, UNSPECIFIED CHRONICITY: ICD-10-CM

## 2021-05-28 DIAGNOSIS — M17.12 PRIMARY LOCALIZED OSTEOARTHRITIS OF LEFT KNEE: Primary | ICD-10-CM

## 2021-05-28 PROCEDURE — 73564 X-RAY EXAM KNEE 4 OR MORE: CPT

## 2021-05-28 PROCEDURE — 99203 OFFICE O/P NEW LOW 30 MIN: CPT | Performed by: ORTHOPAEDIC SURGERY

## 2021-05-28 NOTE — LETTER
May 28, 2021     Patient: Ariel Vargas   YOB: 1970   Date of Visit: 5/28/2021       To Whom it May Concern:    Ariel Vargas is under my professional care  He was seen in my office on 5/28/2021  He can work with no restrictions  If you have any questions or concerns, please don't hesitate to call           Sincerely,          Deja Martel MD        CC: No Recipients

## 2021-05-28 NOTE — ASSESSMENT & PLAN NOTE
Findings consistent with advanced left knee osteoarthritis especially in patellofemoral joint with possible anteromedial plica  Findings and treatment options were discussed with the patient  X-rays were reviewed with him  Discussed conservative treatment options of NSAIDS, hinged knee brace and icing for osteoarthritis  Discussed that if he continues to feel the mechanical symptoms in the patellofemoral joint we recommend a MR arthrogram of the left knee to evaluate for an anteromedial plica  Patient would like to proceed with MR arthrogram since symptoms are worsening  He will follow up with results and Dr Louisa Luciano will go over further treatment recommendations at that time  All questions were answered to patient's satisfaction

## 2021-05-28 NOTE — PROGRESS NOTES
Assessment:     1  Primary localized osteoarthritis of left knee        Plan:  The patient was seen and examined by Dr Get Ricks and myself  Problem List Items Addressed This Visit        Musculoskeletal and Integument    Primary localized osteoarthritis of left knee - Primary     Findings consistent with advanced left knee osteoarthritis especially in patellofemoral joint with possible anteromedial plica  Findings and treatment options were discussed with the patient  X-rays were reviewed with him  Discussed conservative treatment options of NSAIDS, hinged knee brace and icing for osteoarthritis  Discussed that if he continues to feel the mechanical symptoms in the patellofemoral joint we recommend a MR arthrogram of the left knee to evaluate for an anteromedial plica  Patient would like to proceed with MR arthrogram since symptoms are worsening  He will follow up with results and Dr Get Ricks will go over further treatment recommendations at that time  All questions were answered to patient's satisfaction  Relevant Orders    XR knee 4+ vw left injury    MRI arthrogram left knee    FL injection left knee (arthrogram)         Subjective:     Patient ID: Dwayne Dubose is a 52 y o  male  Chief Complaint: This is a 52year old white male here for evaluation of his left knee  Patient has been having intermittent pain for the past few years  He does not recall any injury that started the symptoms  He states that he feels something "pop out of place" in his knee and he has to pop it back in himself  He is unsure what part of his knee is popping out  States that 3 months ago the symptoms started again  No injury or change in activities  He states that the popping sensation is becoming more frequent and it is becoming harder to pop it back in  He has gotten swelling after these instances as well  He states that on 5/23/21 his knee popped out of place in bed and he could not pop it back in this time   He went to ED and nonWB x-rays were taken  He was given knee immobilizer and crutches  He comes to the office wearing a hinged knee brace ambulating with no assistance today  He states the popping has not occurred since using the hinged knee brace  No previous treatment  Patient intake form reviewed  Allergy:  No Known Allergies  Medications:  all current active meds have been reviewed  Past Medical History:  Past Medical History:   Diagnosis Date    Heroin use     Osteoarthritis     bilateral knees     Past Surgical History:  Past Surgical History:   Procedure Laterality Date    TONSILLECTOMY      WISDOM TOOTH EXTRACTION       Family History:  Family History   Problem Relation Age of Onset    Diabetes Father      Social History:  Social History     Substance and Sexual Activity   Alcohol Use Yes    Frequency: Monthly or less    Comment: "maybe twice a year"     Social History     Substance and Sexual Activity   Drug Use Not Currently    Types: Marijuana, Heroin     Social History     Tobacco Use   Smoking Status Current Every Day Smoker    Packs/day: 1 00    Years: 30 00    Pack years: 30 00   Smokeless Tobacco Never Used     Review of Systems   Constitutional: Negative  HENT: Negative  Eyes: Negative  Respiratory: Negative  Cardiovascular: Negative  Gastrointestinal: Negative  Endocrine: Negative  Genitourinary: Negative  Musculoskeletal: Positive for arthralgias and gait problem (antalgic)  Negative for joint swelling  Skin: Negative  Allergic/Immunologic: Negative  Hematological: Negative  Psychiatric/Behavioral: Negative  Objective:  BP Readings from Last 1 Encounters:   05/28/21 120/70      Wt Readings from Last 1 Encounters:   05/28/21 83 9 kg (185 lb)      BMI:   Estimated body mass index is 27 32 kg/m² as calculated from the following:    Height as of this encounter: 5' 9" (1 753 m)  Weight as of this encounter: 83 9 kg (185 lb)    BSA:   Estimated body surface area is 2 meters squared as calculated from the following:    Height as of this encounter: 5' 9" (1 753 m)  Weight as of this encounter: 83 9 kg (185 lb)  Physical Exam  Constitutional:       General: He is not in acute distress  Appearance: He is well-developed  HENT:      Head: Normocephalic  Eyes:      Conjunctiva/sclera: Conjunctivae normal       Pupils: Pupils are equal, round, and reactive to light  Pulmonary:      Effort: Pulmonary effort is normal  No respiratory distress  Musculoskeletal:      Left knee: He exhibits no effusion  Skin:     General: Skin is warm and dry  Neurological:      Mental Status: He is alert and oriented to person, place, and time  Psychiatric:         Behavior: Behavior normal        Left Knee Exam     Tenderness   Left knee tenderness location: patellofemoral     Range of Motion   The patient has normal left knee ROM  Tests   Fito:  Medial - negative Lateral - negative  Varus: negative Valgus: negative  Drawer:  Anterior - negative     Posterior - negative    Other   Erythema: absent  Scars: absent  Sensation: normal  Pulse: present  Swelling: none  Effusion: no effusion present    Comments:  Patellofemoral crepitation            I have personally reviewed pertinent films in PACS and my interpretation is x-rays of the left knee reveal moderate to severe osteoarthritis with large marginal osteophytes at distal femur of patellofemoral joint

## 2021-06-23 ENCOUNTER — TELEPHONE (OUTPATIENT)
Dept: OBGYN CLINIC | Facility: HOSPITAL | Age: 51
End: 2021-06-23

## 2021-06-23 ENCOUNTER — TELEPHONE (OUTPATIENT)
Dept: OBGYN CLINIC | Facility: CLINIC | Age: 51
End: 2021-06-23

## 2021-06-23 NOTE — TELEPHONE ENCOUNTER
Marlo Quintero from Radiology is calling with questions regarding a test that was ordered  Called Deandre malick and spk to [facility], she said that [facility] in  [facility] took the call

## 2021-06-23 NOTE — TELEPHONE ENCOUNTER
Radiologist from [facility] requesting more medical information/ prior procedural results concerning this PT  Tigist has an left knee arthrogram scheduled for tomorrow am  Please call drs cell [phone]    Thanks

## 2024-05-15 ENCOUNTER — HOSPITAL ENCOUNTER (EMERGENCY)
Facility: HOSPITAL | Age: 54
Discharge: HOME | End: 2024-05-15
Attending: EMERGENCY MEDICINE | Admitting: EMERGENCY MEDICINE
Payer: COMMERCIAL

## 2024-05-15 VITALS
TEMPERATURE: 98.5 F | OXYGEN SATURATION: 95 % | DIASTOLIC BLOOD PRESSURE: 82 MMHG | WEIGHT: 175 LBS | BODY MASS INDEX: 25.92 KG/M2 | RESPIRATION RATE: 18 BRPM | SYSTOLIC BLOOD PRESSURE: 139 MMHG | HEART RATE: 86 BPM | HEIGHT: 69 IN

## 2024-05-15 DIAGNOSIS — S05.01XA ABRASION OF RIGHT CORNEA, INITIAL ENCOUNTER: Primary | ICD-10-CM

## 2024-05-15 PROCEDURE — 99283 EMERGENCY DEPT VISIT LOW MDM: CPT

## 2024-05-15 PROCEDURE — 99284 EMERGENCY DEPT VISIT MOD MDM: CPT | Performed by: PHYSICIAN ASSISTANT

## 2024-05-15 RX ORDER — TETRACAINE HYDROCHLORIDE 5 MG/ML
2 SOLUTION OPHTHALMIC ONCE
Status: COMPLETED | OUTPATIENT
Start: 2024-05-15 | End: 2024-05-15

## 2024-05-15 RX ORDER — ERYTHROMYCIN 5 MG/G
OINTMENT OPHTHALMIC
Qty: 3.5 G | Refills: 0 | Status: SHIPPED | OUTPATIENT
Start: 2024-05-15

## 2024-05-15 RX ORDER — ERYTHROMYCIN 5 MG/G
0.5 OINTMENT OPHTHALMIC ONCE
Status: COMPLETED | OUTPATIENT
Start: 2024-05-15 | End: 2024-05-15

## 2024-05-15 RX ADMIN — ERYTHROMYCIN 0.5 INCH: 5 OINTMENT OPHTHALMIC at 22:09

## 2024-05-15 RX ADMIN — FLUORESCEIN SODIUM 1 STRIP: 1 STRIP OPHTHALMIC at 21:39

## 2024-05-15 RX ADMIN — TETRACAINE HYDROCHLORIDE 2 DROP: 5 SOLUTION OPHTHALMIC at 21:39

## 2024-05-16 NOTE — ED PROVIDER NOTES
"History  Chief Complaint   Patient presents with    Eye Problem     1400 today pt was drilling into wood and a piece fell into his eye.  Reports blurred vision and \"can't look up\".       Patient is a 52 yo wm who reports she was drilling wood with an auger bit  2 PM today when he felt a piece of debris fall into the right eye.  Reports he had tearing since.  He flushed the eye with warm water.  He says he is having pain under the right upper eyelid.  No visual disturbance.  No other complaints.  Last tetanus 2 years ago.        Prior to Admission Medications   Prescriptions Last Dose Informant Patient Reported? Taking?   Naproxen Sodium (ALEVE PO)   Yes No   Sig: Take by mouth   chlorhexidine (PERIDEX) 0.12 % solution   No No   Sig: Apply 15 mL to the mouth or throat 2 (two) times a day Swish, gargle and spit   Patient not taking: Reported on 5/23/2021   nicotine (NICODERM CQ) 21 mg/24 hr TD 24 hr patch   No No   Sig: Place 1 patch on the skin daily   Patient not taking: Reported on 2/20/2021      Facility-Administered Medications: None       Past Medical History:   Diagnosis Date    Heroin use     Osteoarthritis     bilateral knees       Past Surgical History:   Procedure Laterality Date    TONSILLECTOMY      WISDOM TOOTH EXTRACTION         Family History   Problem Relation Age of Onset    Diabetes Father      I have reviewed and agree with the history as documented.    E-Cigarette/Vaping    E-Cigarette Use Never User      E-Cigarette/Vaping Substances     Social History     Tobacco Use    Smoking status: Every Day     Current packs/day: 1.00     Average packs/day: 1 pack/day for 30.0 years (30.0 ttl pk-yrs)     Types: Cigarettes    Smokeless tobacco: Never   Vaping Use    Vaping status: Never Used   Substance Use Topics    Alcohol use: Yes     Comment: \"maybe twice a year\"    Drug use: Not Currently     Types: Marijuana, Heroin       Review of Systems   Constitutional:  Negative for fever.   Eyes:  Positive for " photophobia, pain and redness. Negative for discharge, itching and visual disturbance.       Physical Exam  Physical Exam  Vitals and nursing note reviewed.   Constitutional:       General: He is not in acute distress.     Appearance: Normal appearance. He is not ill-appearing, toxic-appearing or diaphoretic.   HENT:      Head: Normocephalic and atraumatic.      Nose: Nose normal.      Mouth/Throat:      Mouth: Mucous membranes are moist.      Pharynx: Oropharynx is clear.   Eyes:      Extraocular Movements: Extraocular movements intact.      Conjunctiva/sclera: Conjunctivae normal.      Pupils: Pupils are equal, round, and reactive to light.      Comments: No embedded corneal foreign body on the right.  No conjunctival foreign body on the right.  There is punctate injected area of o'clock position of sclera at the border of the iris.  There is fluorescein uptake to this area.   Neurological:      Mental Status: He is alert.         Vital Signs  ED Triage Vitals [05/15/24 1947]   Temperature Pulse Respirations Blood Pressure SpO2   98.5 °F (36.9 °C) 86 18 139/82 95 %      Temp Source Heart Rate Source Patient Position - Orthostatic VS BP Location FiO2 (%)   Oral Monitor -- Right arm --      Pain Score       4           Vitals:    05/15/24 1947   BP: 139/82   Pulse: 86         Visual Acuity  Visual Acuity      Flowsheet Row Most Recent Value   Visual acuity R eye is 20/25   Visual acuity Left eye is 20/25   Visual acuity in both eyes is 20/20   Wearing corrective eyewear/lenses? No            ED Medications  Medications   tetracaine 0.5 % ophthalmic solution 2 drop (2 drops Right Eye Given by Other 5/15/24 2139)   fluorescein sodium sterile ophthalmic strip 1 strip (1 strip Right Eye Given by Other 5/15/24 2139)   erythromycin (ILOTYCIN) 0.5 % ophthalmic ointment 0.5 inch (0.5 inches Right Eye Given 5/15/24 2209)       Diagnostic Studies  Results Reviewed       None                   No orders to display               Procedures  Procedures         ED Course                               SBIRT 22yo+      Flowsheet Row Most Recent Value   Initial Alcohol Screen: US AUDIT-C     1. How often do you have a drink containing alcohol? 0 Filed at: 05/15/2024 2133   2. How many drinks containing alcohol do you have on a typical day you are drinking?  0 Filed at: 05/15/2024 2133   3a. Male UNDER 65: How often do you have five or more drinks on one occasion? 0 Filed at: 05/15/2024 2133   Audit-C Score 0 Filed at: 05/15/2024 2133   SPENCER: How many times in the past year have you...    Used an illegal drug or used a prescription medication for non-medical reasons? Never Filed at: 05/15/2024 2133                      Medical Decision Making  I have considered corneal abrasion, scleral abrasion, embedded corneal foreign body, conjunctival foreign body on my differential diagnosis.  There is area of fluorescein uptake at the 11 o'clock position at the border of the sclera and iris.  There is also slightly injected area to the sclera at this point.  Patient reported symptomatic relief with tetracaine eyedrops.  Plan will be erythromycin ophthalmic ointment and follow-up with ophthalmologist next 1 to 2 days for recheck.  Patient up-to-date on tetanus.  Return precautions.    Risk  Prescription drug management.             Disposition  Final diagnoses:   Abrasion of right cornea, initial encounter     Time reflects when diagnosis was documented in both MDM as applicable and the Disposition within this note       Time User Action Codes Description Comment    5/15/2024 10:01 PM Leonid Mota Add [S05.01XA] Abrasion of right cornea, initial encounter           ED Disposition       ED Disposition   Discharge    Condition   Stable    Date/Time   Wed May 15, 2024 2200    Comment   Milan Lala discharge to home/self care.                   Follow-up Information       Follow up With Specialties Details Why Contact Info    Milan Feliz  MD [facility]   [facility] [address]  [phone]              Discharge Medication List as of 5/15/2024 10:09 PM        START taking these medications    Details   erythromycin (ILOTYCIN) ophthalmic ointment Place a 1/2 inch ribbon of ointment into the lower eyelid., Normal           CONTINUE these medications which have NOT CHANGED    Details   chlorhexidine (PERIDEX) 0.12 % solution Apply 15 mL to the mouth or throat 2 (two) times a day Swish, gargle and spit, Starting Sat 2/20/2021, Normal      Naproxen Sodium (ALEVE PO) Take by mouth, Historical Med      nicotine (NICODERM CQ) 21 mg/24 hr TD 24 hr patch Place 1 patch on the skin daily, Starting Wed 11/6/2019, Print             No discharge procedures on file.    PDMP Review         Value Time User    PDMP Reviewed  Yes 11/5/2019  9:16 AM Evaristo Givens MD            ED Provider  Electronically Signed by             Leonid Mota PA-C  05/15/24 1560

## 2024-05-16 NOTE — DISCHARGE INSTRUCTIONS
Erythromycin ointment 1/2 inch ribbon to R lower eyelid every 4-6 hours    Follow up with eye doctor Dr Benitez for recheck next 1-2 days    Return to ED for eye drainage, decreased vision, worsening symptoms

## 2024-10-02 ENCOUNTER — HOSPITAL ENCOUNTER (EMERGENCY)
Facility: HOSPITAL | Age: 54
Discharge: HOME | End: 2024-10-02
Attending: EMERGENCY MEDICINE
Payer: COMMERCIAL

## 2024-10-02 ENCOUNTER — APPOINTMENT (EMERGENCY)
Dept: RADIOLOGY | Facility: HOSPITAL | Age: 54
End: 2024-10-02
Payer: COMMERCIAL

## 2024-10-02 VITALS
WEIGHT: 175 LBS | RESPIRATION RATE: 20 BRPM | BODY MASS INDEX: 25.92 KG/M2 | DIASTOLIC BLOOD PRESSURE: 89 MMHG | OXYGEN SATURATION: 96 % | HEART RATE: 87 BPM | HEIGHT: 69 IN | SYSTOLIC BLOOD PRESSURE: 131 MMHG | TEMPERATURE: 97.9 F

## 2024-10-02 DIAGNOSIS — M79.5 FOREIGN BODY (FB) IN SOFT TISSUE: Primary | ICD-10-CM

## 2024-10-02 PROCEDURE — 99283 EMERGENCY DEPT VISIT LOW MDM: CPT

## 2024-10-02 PROCEDURE — 73140 X-RAY EXAM OF FINGER(S): CPT

## 2024-10-02 PROCEDURE — 99284 EMERGENCY DEPT VISIT MOD MDM: CPT | Performed by: PHYSICIAN ASSISTANT

## 2024-10-02 RX ORDER — CEPHALEXIN 500 MG/1
500 CAPSULE ORAL EVERY 6 HOURS SCHEDULED
Qty: 28 CAPSULE | Refills: 0 | Status: SHIPPED | OUTPATIENT
Start: 2024-10-02 | End: 2024-10-09

## 2024-10-02 NOTE — ED PROVIDER NOTES
Final diagnoses:   Foreign body (FB) in soft tissue - left fifth digit     ED Disposition       ED Disposition   Discharge    Condition   Stable    Date/Time   Wed Oct 2, 2024  7:21 PM    Comment   Milan Lala discharge to home/self care.                   Assessment & Plan       Medical Decision Making  Amount and/or Complexity of Data Reviewed  Radiology: ordered and independent interpretation performed.    Risk  Prescription drug management.    Patient presenting to ER for FB in left 5th digit, no palpable FB, will xray to further evaluate.  No radiopaque FB.  Patient states tetanus is UTD.  Discussed attempting to remove FB in ER vs referring to hand surgeon, patient would prefer to f/u with hand surgeon since unable to palpate FB.  Will start on abx and refer to hand surgeon.           Medications - No data to display    ED Risk Strat Scores                           SBIRT 20yo+      Flowsheet Row Most Recent Value   Initial Alcohol Screen: US AUDIT-C     1. How often do you have a drink containing alcohol? 1 Filed at: 10/02/2024 1843   2. How many drinks containing alcohol do you have on a typical day you are drinking?  0 Filed at: 10/02/2024 1843   3a. Male UNDER 65: How often do you have five or more drinks on one occasion? 0 Filed at: 10/02/2024 1843   Audit-C Score 1 Filed at: 10/02/2024 1843   SPENCER: How many times in the past year have you...    Used an illegal drug or used a prescription medication for non-medical reasons? Never Filed at: 10/02/2024 1843                            History of Present Illness       Chief Complaint   Patient presents with    Foreign Body in Skin     Pt presents to ER for reports of foreign body in L 5th digit - was moving a piece of wood and felt a large splinter go in and thinks it broke off inside. Pt reports last tetanus shot was in the last year.        Past Medical History:   Diagnosis Date    Heroin use     Osteoarthritis     bilateral knees      Past Surgical  "History:   Procedure Laterality Date    TONSILLECTOMY      WISDOM TOOTH EXTRACTION        Family History   Problem Relation Age of Onset    Diabetes Father       Social History     Tobacco Use    Smoking status: Every Day     Current packs/day: 1.00     Average packs/day: 1 pack/day for 30.0 years (30.0 ttl pk-yrs)     Types: Cigarettes    Smokeless tobacco: Never   Vaping Use    Vaping status: Never Used   Substance Use Topics    Alcohol use: Yes     Comment: \"maybe twice a year\"    Drug use: Not Currently     Types: Marijuana, Heroin      E-Cigarette/Vaping    E-Cigarette Use Never User       E-Cigarette/Vaping Substances      I have reviewed and agree with the history as documented.       Foreign Body in Skin    Patient is 53 y/o M that presents to the ED with foreign body in left 5th digit that occurred at work around 1PM today.  He states he is an  and picked up a piece of wood and a large splinter went into his left 5th digit.  Patient has FROM of finger.  He states his last tetanus was around 10 mos ago.      Review of Systems   Constitutional:  Negative for chills and fever.   Skin:  Positive for wound (puncture wound to volar left 5th digit.).   Neurological:  Positive for numbness (left 5th digit.). Negative for weakness.   All other systems reviewed and are negative.          Objective       ED Triage Vitals [10/02/24 1842]   Temperature Pulse Blood Pressure Respirations SpO2 Patient Position - Orthostatic VS   97.9 °F (36.6 °C) 87 131/89 20 96 % Sitting      Temp src Heart Rate Source BP Location FiO2 (%) Pain Score    -- Monitor Right arm -- --      Vitals      Date and Time Temp Pulse SpO2 Resp BP Pain Score FACES Pain Rating User   10/02/24 1842 97.9 °F (36.6 °C) 87 96 % 20 131/89 -- -- AM            Physical Exam  Vitals and nursing note reviewed.   Constitutional:       General: He is not in acute distress.     Appearance: Normal appearance. He is well-developed and well-groomed. He is " not ill-appearing.   HENT:      Head: Normocephalic and atraumatic.   Cardiovascular:      Pulses:           Radial pulses are 2+ on the left side.   Musculoskeletal:      Comments: FROM of finger of left hand.  NO swelling or erythema to left 5th digit.  There are 2 small puncture wounds to volar left 5th digit.  He has tenderness to entire digit.  No palpable FB.   Skin:     General: Skin is warm and dry.      Findings: Wound (2 small puncture wound to volar left 5th digit.) present.   Neurological:      Mental Status: He is alert and oriented to person, place, and time.      Sensory: Sensation is intact.      Motor: Motor function is intact.   Psychiatric:         Behavior: Behavior is cooperative.         Results Reviewed       None            XR finger fifth digit-pinkie LEFT   ED Interpretation by Tierney Prado PA-C (10/02 1911)   No acute fracture, no foreign body.           Procedures    ED Medication and Procedure Management   Prior to Admission Medications   Prescriptions Last Dose Informant Patient Reported? Taking?   Naproxen Sodium (ALEVE PO)   Yes No   Sig: Take by mouth   chlorhexidine (PERIDEX) 0.12 % solution   No No   Sig: Apply 15 mL to the mouth or throat 2 (two) times a day Swish, gargle and spit   Patient not taking: Reported on 5/23/2021   erythromycin (ILOTYCIN) ophthalmic ointment   No No   Sig: Place a 1/2 inch ribbon of ointment into the lower eyelid.   nicotine (NICODERM CQ) 21 mg/24 hr TD 24 hr patch   No No   Sig: Place 1 patch on the skin daily   Patient not taking: Reported on 2/20/2021      Facility-Administered Medications: None     Discharge Medication List as of 10/2/2024  7:25 PM        START taking these medications    Details   cephalexin (KEFLEX) 500 mg capsule Take 1 capsule (500 mg total) by mouth every 6 (six) hours for 7 days, Starting Wed 10/2/2024, Until Wed 10/9/2024, Normal           CONTINUE these medications which have NOT CHANGED    Details   chlorhexidine  (PERIDEX) 0.12 % solution Apply 15 mL to the mouth or throat 2 (two) times a day Swish, gargle and spit, Starting Sat 2/20/2021, Normal      erythromycin (ILOTYCIN) ophthalmic ointment Place a 1/2 inch ribbon of ointment into the lower eyelid., Normal      Naproxen Sodium (ALEVE PO) Take by mouth, Historical Med      nicotine (NICODERM CQ) 21 mg/24 hr TD 24 hr patch Place 1 patch on the skin daily, Starting Wed 11/6/2019, Print             ED SEPSIS DOCUMENTATION   Time reflects when diagnosis was documented in both MDM as applicable and the Disposition within this note       Time User Action Codes Description Comment    10/2/2024  7:21 PM Tierney Prado Add [M79.5] Foreign body (FB) in soft tissue     10/2/2024  7:21 PM Tierney Prado Modify [M79.5] Foreign body (FB) in soft tissue left fifth digit                 Tierney Prado PA-C  10/02/24 2001

## 2024-10-02 NOTE — DISCHARGE INSTRUCTIONS
Rest, elevate hand.  Keflex 4 times a day for 7 days.  Call ortho tomorrow for further evaluation.